# Patient Record
Sex: FEMALE | Race: WHITE | NOT HISPANIC OR LATINO | Employment: PART TIME
[De-identification: names, ages, dates, MRNs, and addresses within clinical notes are randomized per-mention and may not be internally consistent; named-entity substitution may affect disease eponyms.]

---

## 2019-01-01 ENCOUNTER — EXTERNAL RECORD (OUTPATIENT)
Dept: HEALTH INFORMATION MANAGEMENT | Facility: OTHER | Age: 19
End: 2019-01-01

## 2019-03-26 ENCOUNTER — TELEPHONE (OUTPATIENT)
Dept: MATERNAL FETAL MEDICINE | Age: 19
End: 2019-03-26

## 2019-04-11 ENCOUNTER — HOSPITAL (OUTPATIENT)
Dept: OTHER | Age: 19
End: 2019-04-11
Attending: OBSTETRICS & GYNECOLOGY

## 2019-04-11 ENCOUNTER — HOSPITAL (OUTPATIENT)
Dept: OTHER | Age: 19
End: 2019-04-11

## 2019-04-11 LAB
ALBUMIN SERPL-MCNC: 2.6 GM/DL (ref 3.6–5.1)
ALBUMIN/GLOB SERPL: 0.6 {RATIO} (ref 1–2.4)
ALP SERPL-CCNC: 108 UNIT/L (ref 45–117)
ALT SERPL-CCNC: 19 UNIT/L
ANALYZER ANC (IANC): ABNORMAL
ANION GAP SERPL CALC-SCNC: 12 MMOL/L (ref 10–20)
APPEARANCE UR: CLEAR
AST SERPL-CCNC: 14 UNIT/L
BASOPHILS # BLD: 0 THOUSAND/MCL (ref 0–0.3)
BASOPHILS NFR BLD: 0 %
BILIRUB SERPL-MCNC: 0.2 MG/DL (ref 0.2–1)
BILIRUB UR QL STRIP: NEGATIVE
BUN SERPL-MCNC: 4 MG/DL (ref 6–20)
BUN/CREAT SERPL: 7 (ref 7–25)
CALCIUM SERPL-MCNC: 8.7 MG/DL (ref 8.4–10.2)
CHLORIDE: 107 MMOL/L (ref 98–107)
CO2 SERPL-SCNC: 24 MMOL/L (ref 21–32)
COLOR UR: YELLOW
CREAT SERPL-MCNC: 0.55 MG/DL (ref 0.51–0.95)
DIFFERENTIAL METHOD BLD: ABNORMAL
EOSINOPHIL # BLD: 0.2 THOUSAND/MCL (ref 0.1–0.5)
EOSINOPHIL NFR BLD: 1 %
ERYTHROCYTE [DISTWIDTH] IN BLOOD: 15.6 % (ref 11–15)
GLOBULIN SER-MCNC: 4.5 GM/DL (ref 2–4)
GLUCOSE SERPL-MCNC: 84 MG/DL (ref 65–99)
GLUCOSE UR STRIP-MCNC: NEGATIVE MG/DL
HEMATOCRIT: 36.1 % (ref 36–46.5)
HEMOCCULT STL QL: NEGATIVE
HGB BLD-MCNC: 12.1 GM/DL (ref 12–15.5)
IMM GRANULOCYTES # BLD AUTO: 0 THOUSAND/MCL (ref 0–0.2)
IMM GRANULOCYTES NFR BLD: 0 %
KETONES UR STRIP-MCNC: ABNORMAL MG/DL
LEUKOCYTE ESTERASE UR QL STRIP: NEGATIVE
LYMPHOCYTES # BLD: 2.8 THOUSAND/MCL (ref 1.2–5.2)
LYMPHOCYTES NFR BLD: 25 %
MCH RBC QN AUTO: 28.4 PG (ref 26–34)
MCHC RBC AUTO-ENTMCNC: 33.5 GM/DL (ref 32–36.5)
MCV RBC AUTO: 84.7 FL (ref 78–100)
MONOCYTES # BLD: 0.6 THOUSAND/MCL (ref 0.3–0.9)
MONOCYTES NFR BLD: 6 %
NEUTROPHILS # BLD: 7.5 THOUSAND/MCL (ref 1.8–8)
NEUTROPHILS NFR BLD: 68 %
NEUTS SEG NFR BLD: ABNORMAL %
NITRITE UR QL STRIP: NEGATIVE
NRBC (NRBCRE): 0 /100 WBC
ORGANIC ACIDS/CREAT UR-SRTO: 212 MG/DL
PH UR STRIP: 6 UNIT (ref 5–7)
PLATELET # BLD: 335 THOUSAND/MCL (ref 140–450)
POTASSIUM SERPL-SCNC: 3.5 MMOL/L (ref 3.4–5.1)
PROT SERPL-MCNC: 7.1 GM/DL (ref 6.4–8.2)
PROT UR STRIP-MCNC: 30 MG/DL
PROT UR-MCNC: 22 MG/DL
PROT/CREAT UR: 104 MG/GM CREAT
RBC # BLD: 4.26 MILLION/MCL (ref 4–5.2)
SODIUM SERPL-SCNC: 139 MMOL/L (ref 135–145)
SP GR UR STRIP: 1.02 (ref 1–1.03)
UROBILINOGEN UR STRIP-MCNC: 0.2 MG/DL (ref 0–1)
WBC # BLD: 11.2 THOUSAND/MCL (ref 4.2–11)

## 2019-06-07 ENCOUNTER — HOSPITAL (OUTPATIENT)
Dept: OTHER | Age: 19
End: 2019-06-07

## 2019-06-07 LAB
ALBUMIN SERPL-MCNC: 2.5 G/DL (ref 3.6–5.1)
ALBUMIN/GLOB SERPL: 0.5 {RATIO} (ref 1–2.4)
ALP SERPL-CCNC: 152 UNITS/L (ref 45–117)
ALT SERPL-CCNC: 20 UNITS/L
ANALYZER ANC (IANC): ABNORMAL
ANION GAP SERPL CALC-SCNC: 14 MMOL/L (ref 10–20)
AST SERPL-CCNC: 17 UNITS/L
BASOPHILS # BLD: 0.1 K/MCL (ref 0–0.3)
BASOPHILS NFR BLD: 0 %
BILIRUB SERPL-MCNC: 0.3 MG/DL (ref 0.2–1)
BUN SERPL-MCNC: 4 MG/DL (ref 6–20)
BUN/CREAT SERPL: 7 (ref 7–25)
CALCIUM SERPL-MCNC: 8.9 MG/DL (ref 8.4–10.2)
CHLORIDE SERPL-SCNC: 109 MMOL/L (ref 98–107)
CO2 SERPL-SCNC: 21 MMOL/L (ref 21–32)
CREAT SERPL-MCNC: 0.59 MG/DL (ref 0.51–0.95)
DIFFERENTIAL METHOD BLD: ABNORMAL
EOSINOPHIL # BLD: 0.1 K/MCL (ref 0.1–0.5)
EOSINOPHIL NFR BLD: 1 %
ERYTHROCYTE [DISTWIDTH] IN BLOOD: 14.1 % (ref 11–15)
GLOBULIN SER-MCNC: 4.7 G/DL (ref 2–4)
GLUCOSE SERPL-MCNC: 79 MG/DL (ref 65–99)
HCT VFR BLD CALC: 35.6 % (ref 36–46.5)
HGB BLD-MCNC: 11.4 G/DL (ref 12–15.5)
IMM GRANULOCYTES # BLD AUTO: 0.1 K/MCL (ref 0–0.2)
IMM GRANULOCYTES NFR BLD: 1 %
LYMPHOCYTES # BLD: 3.5 K/MCL (ref 1.2–5.2)
LYMPHOCYTES NFR BLD: 25 %
MAGNESIUM SERPL-MCNC: 1.9 MG/DL (ref 1.7–2.4)
MCH RBC QN AUTO: 27.2 PG (ref 26–34)
MCHC RBC AUTO-ENTMCNC: 32 G/DL (ref 32–36.5)
MCV RBC AUTO: 85 FL (ref 78–100)
MONOCYTES # BLD: 0.6 K/MCL (ref 0.3–0.9)
MONOCYTES NFR BLD: 4 %
NEUTROPHILS # BLD: 9.7 K/MCL (ref 1.8–8)
NEUTROPHILS NFR BLD: 69 %
NEUTS SEG NFR BLD: ABNORMAL %
NRBC (NRBCRE): 0 /100 WBC
PLATELET # BLD: 310 K/MCL (ref 140–450)
POTASSIUM SERPL-SCNC: 3.7 MMOL/L (ref 3.4–5.1)
PROT SERPL-MCNC: 7.2 G/DL (ref 6.4–8.2)
RBC # BLD: 4.19 MIL/MCL (ref 4–5.2)
SODIUM SERPL-SCNC: 140 MMOL/L (ref 135–145)
WBC # BLD: 13.9 K/MCL (ref 4.2–11)

## 2019-06-07 PROCEDURE — 59025 FETAL NON-STRESS TEST: CPT | Performed by: OBSTETRICS & GYNECOLOGY

## 2019-06-07 PROCEDURE — 99284 EMERGENCY DEPT VISIT MOD MDM: CPT | Performed by: EMERGENCY MEDICINE

## 2019-06-07 PROCEDURE — 93010 ELECTROCARDIOGRAM REPORT: CPT | Performed by: INTERNAL MEDICINE

## 2019-07-09 ENCOUNTER — TELEPHONE (OUTPATIENT)
Dept: SCHEDULING | Age: 19
End: 2019-07-09

## 2019-07-18 ENCOUNTER — TELEPHONE (OUTPATIENT)
Dept: SCHEDULING | Age: 19
End: 2019-07-18

## 2019-07-19 ENCOUNTER — APPOINTMENT (OUTPATIENT)
Dept: INTERNAL MEDICINE | Age: 19
End: 2019-07-19

## 2019-09-27 ENCOUNTER — TELEPHONE (OUTPATIENT)
Dept: SCHEDULING | Age: 19
End: 2019-09-27

## 2019-09-27 ENCOUNTER — OFFICE VISIT (OUTPATIENT)
Dept: INTERNAL MEDICINE | Age: 19
End: 2019-09-27

## 2019-09-27 VITALS
HEART RATE: 82 BPM | HEIGHT: 62 IN | WEIGHT: 233 LBS | DIASTOLIC BLOOD PRESSURE: 78 MMHG | SYSTOLIC BLOOD PRESSURE: 122 MMHG | RESPIRATION RATE: 16 BRPM | OXYGEN SATURATION: 99 % | BODY MASS INDEX: 42.88 KG/M2

## 2019-09-27 DIAGNOSIS — M54.6 CHRONIC RIGHT-SIDED THORACIC BACK PAIN: ICD-10-CM

## 2019-09-27 DIAGNOSIS — Z00.00 HEALTH MAINTENANCE EXAMINATION: Primary | ICD-10-CM

## 2019-09-27 DIAGNOSIS — G89.29 CHRONIC RIGHT-SIDED THORACIC BACK PAIN: ICD-10-CM

## 2019-09-27 DIAGNOSIS — L30.9 ECZEMA, UNSPECIFIED TYPE: ICD-10-CM

## 2019-09-27 PROCEDURE — 99385 PREV VISIT NEW AGE 18-39: CPT | Performed by: FAMILY MEDICINE

## 2019-09-27 RX ORDER — NORETHINDRONE ACETATE AND ETHINYL ESTRADIOL 1MG-20(21)
1 KIT ORAL DAILY
Status: ON HOLD | COMMUNITY
Start: 2018-07-26 | End: 2021-02-16

## 2019-09-27 SDOH — HEALTH STABILITY: MENTAL HEALTH: HOW OFTEN DO YOU HAVE A DRINK CONTAINING ALCOHOL?: MONTHLY OR LESS

## 2019-09-27 ASSESSMENT — ENCOUNTER SYMPTOMS
ALLERGIC/IMMUNOLOGIC NEGATIVE: 1
NEUROLOGICAL NEGATIVE: 1
ROS SKIN COMMENTS: PER HPI
ENDOCRINE NEGATIVE: 1
EYES NEGATIVE: 1
PSYCHIATRIC NEGATIVE: 1
CONSTITUTIONAL NEGATIVE: 1
RESPIRATORY NEGATIVE: 1
HEMATOLOGIC/LYMPHATIC NEGATIVE: 1

## 2019-09-30 ENCOUNTER — LAB SERVICES (OUTPATIENT)
Dept: LAB | Age: 19
End: 2019-09-30

## 2019-09-30 DIAGNOSIS — Z00.00 HEALTH MAINTENANCE EXAMINATION: ICD-10-CM

## 2019-09-30 LAB
APPEARANCE UR: NORMAL
BASOPHILS # BLD AUTO: 0 K/MCL (ref 0–0.3)
BASOPHILS NFR BLD AUTO: 1 %
BILIRUB UR QL STRIP: NEGATIVE
COLOR UR: YELLOW
DIFFERENTIAL METHOD BLD: ABNORMAL
EOSINOPHIL # BLD AUTO: 0.2 K/MCL (ref 0.1–0.5)
EOSINOPHIL NFR SPEC: 2 %
ERYTHROCYTE [DISTWIDTH] IN BLOOD: 22.5 % (ref 11–15)
GLUCOSE UR STRIP-MCNC: NEGATIVE MG/DL
HCT VFR BLD CALC: 40 % (ref 36–46.5)
HGB BLD-MCNC: 11.6 G/DL (ref 12–15.5)
HGB UR QL STRIP: NEGATIVE
IMM GRANULOCYTES # BLD AUTO: 0 K/MCL (ref 0–0.2)
IMM GRANULOCYTES NFR BLD: 0 %
KETONES UR STRIP-MCNC: NEGATIVE MG/DL
LEUKOCYTE ESTERASE UR QL STRIP: NEGATIVE
LYMPHOCYTES # BLD MANUAL: 3.1 K/MCL (ref 1–4.8)
LYMPHOCYTES NFR BLD MANUAL: 37 %
MCH RBC QN AUTO: 22.1 PG (ref 26–34)
MCHC RBC AUTO-ENTMCNC: 29 G/DL (ref 32–36.5)
MCV RBC AUTO: 76.3 FL (ref 78–100)
MONOCYTES # BLD MANUAL: 0.4 K/MCL (ref 0.3–0.9)
MONOCYTES NFR BLD MANUAL: 5 %
NEUTROPHILS # BLD: 4.7 K/MCL (ref 1.8–7.7)
NEUTROPHILS NFR BLD AUTO: 55 %
NITRITE UR QL STRIP: NEGATIVE
NRBC BLD MANUAL-RTO: 0 /100 WBC
PH UR STRIP: 6 UNITS (ref 5–7)
PLATELET # BLD: 388 K/MCL (ref 140–450)
PROT UR STRIP-MCNC: NEGATIVE MG/DL
RBC # BLD: 5.24 MIL/MCL (ref 4–5.2)
SP GR UR STRIP: 1.01 (ref 1–1.03)
SPECIMEN SOURCE: NORMAL
UROBILINOGEN UR STRIP-MCNC: 0.2 MG/DL (ref 0–1)
WBC # BLD: 8.4 K/MCL (ref 4.2–11)

## 2019-09-30 PROCEDURE — 80061 LIPID PANEL: CPT | Performed by: FAMILY MEDICINE

## 2019-09-30 PROCEDURE — 81003 URINALYSIS AUTO W/O SCOPE: CPT | Performed by: FAMILY MEDICINE

## 2019-09-30 PROCEDURE — 80050 GENERAL HEALTH PANEL: CPT | Performed by: FAMILY MEDICINE

## 2019-10-01 ENCOUNTER — TELEPHONE (OUTPATIENT)
Dept: INTERNAL MEDICINE | Age: 19
End: 2019-10-01

## 2019-10-01 LAB
ALBUMIN SERPL-MCNC: 3.3 G/DL (ref 3.6–5.1)
ALBUMIN/GLOB SERPL: 0.8 {RATIO} (ref 1–2.4)
ALP SERPL-CCNC: 91 UNITS/L (ref 45–117)
ALT SERPL-CCNC: 14 UNITS/L
ANION GAP SERPL CALC-SCNC: 14 MMOL/L (ref 10–20)
AST SERPL-CCNC: 19 UNITS/L
BILIRUB SERPL-MCNC: 0.3 MG/DL (ref 0.2–1)
BUN SERPL-MCNC: 7 MG/DL (ref 6–20)
BUN/CREAT SERPL: 11 (ref 7–25)
CALCIUM SERPL-MCNC: 9 MG/DL (ref 8.4–10.2)
CHLORIDE SERPL-SCNC: 108 MMOL/L (ref 98–107)
CHOLEST SERPL-MCNC: 190 MG/DL
CHOLEST/HDLC SERPL: 4.5 {RATIO}
CO2 SERPL-SCNC: 22 MMOL/L (ref 21–32)
CREAT SERPL-MCNC: 0.67 MG/DL (ref 0.51–0.95)
FASTING STATUS PATIENT QL REPORTED: 10 HRS
GLOBULIN SER-MCNC: 4.1 G/DL (ref 2–4)
GLUCOSE SERPL-MCNC: 81 MG/DL (ref 65–99)
HDLC SERPL-MCNC: 42 MG/DL
LDLC SERPL-MCNC: 120 MG/DL
LENGTH OF FAST TIME PATIENT: 10 HRS
NONHDLC SERPL-MCNC: 148 MG/DL
POTASSIUM SERPL-SCNC: 4.8 MMOL/L (ref 3.4–5.1)
PROT SERPL-MCNC: 7.4 G/DL (ref 6.4–8.2)
SODIUM SERPL-SCNC: 139 MMOL/L (ref 135–145)
TRIGL SERPL-MCNC: 142 MG/DL
TSH SERPL-ACNC: 1.68 MCUNITS/ML (ref 0.35–5)

## 2019-11-15 ENCOUNTER — TELEPHONE (OUTPATIENT)
Dept: SCHEDULING | Age: 19
End: 2019-11-15

## 2019-12-23 ENCOUNTER — OFFICE VISIT (OUTPATIENT)
Dept: INTERNAL MEDICINE | Age: 19
End: 2019-12-23

## 2019-12-23 ENCOUNTER — TELEPHONE (OUTPATIENT)
Dept: SCHEDULING | Age: 19
End: 2019-12-23

## 2019-12-23 VITALS
RESPIRATION RATE: 16 BRPM | BODY MASS INDEX: 42.88 KG/M2 | HEART RATE: 85 BPM | OXYGEN SATURATION: 98 % | SYSTOLIC BLOOD PRESSURE: 124 MMHG | HEIGHT: 62 IN | WEIGHT: 233 LBS | DIASTOLIC BLOOD PRESSURE: 82 MMHG

## 2019-12-23 DIAGNOSIS — K21.9 GASTROESOPHAGEAL REFLUX DISEASE WITHOUT ESOPHAGITIS: ICD-10-CM

## 2019-12-23 DIAGNOSIS — J02.9 PHARYNGITIS, UNSPECIFIED ETIOLOGY: Primary | ICD-10-CM

## 2019-12-23 PROCEDURE — 99214 OFFICE O/P EST MOD 30 MIN: CPT | Performed by: FAMILY MEDICINE

## 2019-12-23 RX ORDER — AMOXICILLIN AND CLAVULANATE POTASSIUM 875; 125 MG/1; MG/1
1 TABLET, FILM COATED ORAL EVERY 12 HOURS
Qty: 20 TABLET | Refills: 0 | Status: ON HOLD | OUTPATIENT
Start: 2019-12-23 | End: 2021-02-16

## 2019-12-23 RX ORDER — FAMOTIDINE 20 MG/1
20 TABLET, FILM COATED ORAL 2 TIMES DAILY
Qty: 60 TABLET | Refills: 5 | Status: ON HOLD | OUTPATIENT
Start: 2019-12-23 | End: 2021-02-16

## 2019-12-23 RX ORDER — LEVONORGESTREL AND ETHINYL ESTRADIOL 0.15-0.03
1 KIT ORAL DAILY
Refills: 3 | Status: ON HOLD | COMMUNITY
Start: 2019-11-10 | End: 2021-02-16

## 2019-12-23 ASSESSMENT — ENCOUNTER SYMPTOMS
CONSTITUTIONAL NEGATIVE: 1
COUGH: 1
SORE THROAT: 1

## 2020-01-01 ENCOUNTER — EXTERNAL RECORD (OUTPATIENT)
Dept: HEALTH INFORMATION MANAGEMENT | Facility: OTHER | Age: 20
End: 2020-01-01

## 2020-03-06 ENCOUNTER — OFFICE VISIT (OUTPATIENT)
Dept: INTERNAL MEDICINE | Age: 20
End: 2020-03-06

## 2020-03-06 ENCOUNTER — LAB SERVICES (OUTPATIENT)
Dept: LAB | Age: 20
End: 2020-03-06

## 2020-03-06 ENCOUNTER — TELEPHONE (OUTPATIENT)
Dept: SCHEDULING | Age: 20
End: 2020-03-06

## 2020-03-06 VITALS
HEART RATE: 78 BPM | BODY MASS INDEX: 46.74 KG/M2 | OXYGEN SATURATION: 99 % | WEIGHT: 254 LBS | DIASTOLIC BLOOD PRESSURE: 80 MMHG | HEIGHT: 62 IN | TEMPERATURE: 99.6 F | SYSTOLIC BLOOD PRESSURE: 124 MMHG

## 2020-03-06 DIAGNOSIS — R19.7 DIARRHEA OF PRESUMED INFECTIOUS ORIGIN: ICD-10-CM

## 2020-03-06 DIAGNOSIS — R21 RASH: Primary | ICD-10-CM

## 2020-03-06 DIAGNOSIS — Z3A.08 8 WEEKS GESTATION OF PREGNANCY: ICD-10-CM

## 2020-03-06 DIAGNOSIS — K21.9 GASTROESOPHAGEAL REFLUX DISEASE WITHOUT ESOPHAGITIS: ICD-10-CM

## 2020-03-06 LAB
ALBUMIN SERPL-MCNC: 3.2 G/DL (ref 3.6–5.1)
ALBUMIN/GLOB SERPL: 0.8 {RATIO} (ref 1–2.4)
ALP SERPL-CCNC: 80 UNITS/L (ref 45–117)
ALT SERPL-CCNC: 17 UNITS/L
ANION GAP SERPL CALC-SCNC: 11 MMOL/L (ref 10–20)
AST SERPL-CCNC: 14 UNITS/L
BILIRUB SERPL-MCNC: 0.3 MG/DL (ref 0.2–1)
BUN SERPL-MCNC: 7 MG/DL (ref 6–20)
BUN/CREAT SERPL: 12 (ref 7–25)
CALCIUM SERPL-MCNC: 8.9 MG/DL (ref 8.4–10.2)
CHLORIDE SERPL-SCNC: 110 MMOL/L (ref 98–107)
CO2 SERPL-SCNC: 23 MMOL/L (ref 21–32)
CREAT SERPL-MCNC: 0.6 MG/DL (ref 0.51–0.95)
GLOBULIN SER-MCNC: 4.2 G/DL (ref 2–4)
GLUCOSE SERPL-MCNC: 95 MG/DL (ref 65–99)
LENGTH OF FAST TIME PATIENT: 8 HRS
POTASSIUM SERPL-SCNC: 4.2 MMOL/L (ref 3.4–5.1)
PROT SERPL-MCNC: 7.4 G/DL (ref 6.4–8.2)
SODIUM SERPL-SCNC: 140 MMOL/L (ref 135–145)

## 2020-03-06 PROCEDURE — 99213 OFFICE O/P EST LOW 20 MIN: CPT | Performed by: FAMILY MEDICINE

## 2020-03-06 PROCEDURE — 36415 COLL VENOUS BLD VENIPUNCTURE: CPT | Performed by: FAMILY MEDICINE

## 2020-03-06 PROCEDURE — 80053 COMPREHEN METABOLIC PANEL: CPT | Performed by: FAMILY MEDICINE

## 2020-03-06 RX ORDER — FLUTICASONE PROPIONATE 50 MCG
SPRAY, SUSPENSION (ML) NASAL
Refills: 0 | Status: ON HOLD | COMMUNITY
Start: 2019-12-18 | End: 2021-02-16

## 2020-03-06 RX ORDER — BETAMETHASONE DIPROPIONATE 0.5 MG/G
CREAM TOPICAL 2 TIMES DAILY
Qty: 30 G | Refills: 0 | Status: SHIPPED | OUTPATIENT
Start: 2020-03-06 | End: 2020-03-13

## 2020-03-06 ASSESSMENT — ENCOUNTER SYMPTOMS
WOUND: 0
LIGHT-HEADEDNESS: 0
COUGH: 0
FATIGUE: 0
DIARRHEA: 1
VOICE CHANGE: 0
APPETITE CHANGE: 0
SLEEP DISTURBANCE: 0
CHEST TIGHTNESS: 0
BACK PAIN: 0
SORE THROAT: 0
ABDOMINAL PAIN: 1
WEAKNESS: 0
NUMBNESS: 0
BRUISES/BLEEDS EASILY: 0
BLOOD IN STOOL: 0
CHILLS: 0
NAUSEA: 1
CONSTIPATION: 0
SPEECH DIFFICULTY: 0
EYE DISCHARGE: 0
SEIZURES: 0
ACTIVITY CHANGE: 0
DIZZINESS: 0
WHEEZING: 0
EYE REDNESS: 0
NERVOUS/ANXIOUS: 0
ABDOMINAL DISTENTION: 0
FEVER: 0
SHORTNESS OF BREATH: 0
VOMITING: 0

## 2020-03-09 ENCOUNTER — TELEPHONE (OUTPATIENT)
Dept: SCHEDULING | Age: 20
End: 2020-03-09

## 2020-03-09 ENCOUNTER — TELEPHONE (OUTPATIENT)
Dept: INTERNAL MEDICINE | Age: 20
End: 2020-03-09

## 2020-03-16 ENCOUNTER — NURSE TRIAGE (OUTPATIENT)
Dept: TELEHEALTH | Age: 20
End: 2020-03-16

## 2020-03-16 ENCOUNTER — TELEPHONE (OUTPATIENT)
Dept: SCHEDULING | Age: 20
End: 2020-03-16

## 2020-03-20 ENCOUNTER — E-ADVICE (OUTPATIENT)
Dept: INTERNAL MEDICINE | Age: 20
End: 2020-03-20

## 2020-03-23 ENCOUNTER — TELEPHONE (OUTPATIENT)
Dept: SCHEDULING | Age: 20
End: 2020-03-23

## 2020-04-30 ENCOUNTER — TELEPHONE (OUTPATIENT)
Dept: SCHEDULING | Age: 20
End: 2020-04-30

## 2020-04-30 ENCOUNTER — WALK IN (OUTPATIENT)
Dept: URGENT CARE | Age: 20
End: 2020-04-30

## 2020-04-30 VITALS
BODY MASS INDEX: 46.82 KG/M2 | TEMPERATURE: 98 F | HEIGHT: 62 IN | WEIGHT: 254.41 LBS | SYSTOLIC BLOOD PRESSURE: 127 MMHG | DIASTOLIC BLOOD PRESSURE: 81 MMHG | RESPIRATION RATE: 18 BRPM | OXYGEN SATURATION: 97 % | HEART RATE: 75 BPM

## 2020-04-30 DIAGNOSIS — R10.9 LEFT FLANK PAIN: ICD-10-CM

## 2020-04-30 DIAGNOSIS — R39.15 URINARY URGENCY: Primary | ICD-10-CM

## 2020-04-30 LAB
APPEARANCE, POC: CLEAR
B-HCG UR QL: NEGATIVE
BILIRUBIN, POC: NEGATIVE
COLOR, POC: NORMAL
GLUCOSE UR-MCNC: NEGATIVE MG/DL
KETONES, POC: NEGATIVE
NITRITE, POC: NEGATIVE
OCCULT BLOOD, POC: NEGATIVE
PH UR: 6.5 [PH] (ref 5–7)
PROT UR-MCNC: NEGATIVE G/DL
SP GR UR: >= 1.03 (ref 1–1.03)
UROBILINOGEN UR-MCNC: 0.2 MG/DL (ref 0–1)
WBC (LEUKOCYTE) ESTERASE, POC: NEGATIVE

## 2020-04-30 PROCEDURE — 81002 URINALYSIS NONAUTO W/O SCOPE: CPT | Performed by: NURSE PRACTITIONER

## 2020-04-30 PROCEDURE — 99214 OFFICE O/P EST MOD 30 MIN: CPT | Performed by: NURSE PRACTITIONER

## 2020-04-30 PROCEDURE — 87147 CULTURE TYPE IMMUNOLOGIC: CPT | Performed by: NURSE PRACTITIONER

## 2020-04-30 PROCEDURE — 81025 URINE PREGNANCY TEST: CPT | Performed by: NURSE PRACTITIONER

## 2020-04-30 PROCEDURE — 87086 URINE CULTURE/COLONY COUNT: CPT | Performed by: NURSE PRACTITIONER

## 2020-04-30 RX ORDER — CEPHALEXIN 500 MG/1
500 CAPSULE ORAL 4 TIMES DAILY
Qty: 40 CAPSULE | Refills: 0 | Status: SHIPPED | OUTPATIENT
Start: 2020-04-30 | End: 2020-05-10

## 2020-04-30 RX ORDER — ACETAMINOPHEN 500 MG
1000 TABLET ORAL EVERY 6 HOURS PRN
Qty: 56 TABLET | Refills: 0 | Status: SHIPPED | OUTPATIENT
Start: 2020-04-30 | End: 2020-05-07

## 2020-04-30 SDOH — HEALTH STABILITY: MENTAL HEALTH: HOW OFTEN DO YOU HAVE A DRINK CONTAINING ALCOHOL?: NOT ASKED

## 2020-05-02 LAB
BACTERIA UR CULT: ABNORMAL
REPORT STATUS (RPT): ABNORMAL
SPECIMEN SOURCE: ABNORMAL

## 2020-10-17 ENCOUNTER — TELEPHONE (OUTPATIENT)
Dept: SCHEDULING | Age: 20
End: 2020-10-17

## 2020-10-22 ENCOUNTER — V-VISIT (OUTPATIENT)
Dept: INTERNAL MEDICINE | Age: 20
End: 2020-10-22

## 2020-10-22 DIAGNOSIS — E28.2 PCOS (POLYCYSTIC OVARIAN SYNDROME): ICD-10-CM

## 2020-10-22 DIAGNOSIS — F41.9 ANXIETY: Primary | ICD-10-CM

## 2020-10-22 PROCEDURE — 96127 BRIEF EMOTIONAL/BEHAV ASSMT: CPT | Performed by: FAMILY MEDICINE

## 2020-10-22 PROCEDURE — 99214 OFFICE O/P EST MOD 30 MIN: CPT | Performed by: FAMILY MEDICINE

## 2020-10-22 RX ORDER — SERTRALINE HYDROCHLORIDE 25 MG/1
25 TABLET, FILM COATED ORAL DAILY
Qty: 30 TABLET | Refills: 3 | Status: ON HOLD | OUTPATIENT
Start: 2020-10-22 | End: 2021-02-16

## 2020-10-22 ASSESSMENT — ANXIETY QUESTIONNAIRES
1. FEELING NERVOUS, ANXIOUS, OR ON EDGE: MORE THAN HALF THE DAYS
GAD7 TOTAL SCORE: 12
7. FEELING AFRAID AS IF SOMETHING AWFUL MIGHT HAPPEN: SEVERAL DAYS
5. BEING SO RESTLESS THAT IT IS HARD TO SIT STILL: SEVERAL DAYS
2. NOT BEING ABLE TO STOP OR CONTROL WORRYING: 2
4. TROUBLE RELAXING: 2
1. FEELING NERVOUS, ANXIOUS, OR ON EDGE: 2
3. WORRYING TOO MUCH ABOUT DIFFERENT THINGS: MORE THAN HALF THE DAYS
IF YOU CHECKED OFF ANY PROBLEMS ON THIS QUESTIONNAIRE, HOW DIFFICULT HAVE THESE PROBLEMS MADE IT FOR YOU TO DO YOUR WORK, TAKE CARE OF THINGS AT HOME, OR GET ALONG WITH OTHER PEOPLE: VERY DIFFICULT
5. BEING SO RESTLESS THAT IT IS HARD TO SIT STILL: 1
3. WORRYING TOO MUCH ABOUT DIFFERENT THINGS: 2
6. BECOMING EASILY ANNOYED OR IRRITABLE: 2
2. NOT BEING ABLE TO STOP OR CONTROL WORRYING: MORE THAN HALF THE DAYS
6. BECOMING EASILY ANNOYED OR IRRITABLE: MORE THAN HALF THE DAYS
4. TROUBLE RELAXING: MORE THAN HALF THE DAYS
7. FEELING AFRAID AS IF SOMETHING AWFUL MIGHT HAPPEN: 1

## 2020-10-22 ASSESSMENT — PATIENT HEALTH QUESTIONNAIRE - PHQ9
CLINICAL INTERPRETATION OF PHQ2 SCORE: NO FURTHER SCREENING NEEDED
1. LITTLE INTEREST OR PLEASURE IN DOING THINGS: SEVERAL DAYS
2. FEELING DOWN, DEPRESSED OR HOPELESS: SEVERAL DAYS
CLINICAL INTERPRETATION OF PHQ9 SCORE: NO FURTHER SCREENING NEEDED
SUM OF ALL RESPONSES TO PHQ9 QUESTIONS 1 AND 2: 2
SUM OF ALL RESPONSES TO PHQ9 QUESTIONS 1 AND 2: 2

## 2020-10-22 ASSESSMENT — ENCOUNTER SYMPTOMS
CONSTITUTIONAL NEGATIVE: 1
RESPIRATORY NEGATIVE: 1

## 2020-11-19 ENCOUNTER — APPOINTMENT (OUTPATIENT)
Dept: INTERNAL MEDICINE | Age: 20
End: 2020-11-19

## 2020-12-29 ENCOUNTER — TELEPHONE (OUTPATIENT)
Dept: SCHEDULING | Age: 20
End: 2020-12-29

## 2021-01-01 ENCOUNTER — EXTERNAL RECORD (OUTPATIENT)
Dept: HEALTH INFORMATION MANAGEMENT | Facility: OTHER | Age: 21
End: 2021-01-01

## 2021-01-01 ENCOUNTER — EXTERNAL RECORD (OUTPATIENT)
Dept: OTHER | Age: 21
End: 2021-01-01

## 2021-02-15 ENCOUNTER — HOSPITAL ENCOUNTER (OUTPATIENT)
Age: 21
Discharge: HOME | End: 2021-02-16
Attending: OBSTETRICS & GYNECOLOGY | Admitting: OBSTETRICS & GYNECOLOGY

## 2021-02-15 ENCOUNTER — TELEPHONE (OUTPATIENT)
Dept: SCHEDULING | Age: 21
End: 2021-02-15

## 2021-02-15 ENCOUNTER — APPOINTMENT (OUTPATIENT)
Dept: GENERAL RADIOLOGY | Age: 21
End: 2021-02-15

## 2021-02-15 DIAGNOSIS — R00.2 HEART PALPITATIONS: ICD-10-CM

## 2021-02-15 DIAGNOSIS — Z3A.17 17 WEEKS GESTATION OF PREGNANCY: ICD-10-CM

## 2021-02-15 DIAGNOSIS — Z33.1 PREGNANT STATE, INCIDENTAL: ICD-10-CM

## 2021-02-15 DIAGNOSIS — R06.00 DYSPNEA, UNSPECIFIED TYPE: Primary | ICD-10-CM

## 2021-02-15 DIAGNOSIS — R03.0 ELEVATED BLOOD PRESSURE READING: ICD-10-CM

## 2021-02-15 LAB
ABO + RH BLD: NORMAL
ALBUMIN SERPL-MCNC: 2.9 G/DL (ref 3.6–5.1)
ALBUMIN/GLOB SERPL: 0.6 {RATIO} (ref 1–2.4)
ALP SERPL-CCNC: 89 UNITS/L (ref 45–117)
ALT SERPL-CCNC: 23 UNITS/L
ANION GAP SERPL CALC-SCNC: 10 MMOL/L (ref 10–20)
APPEARANCE UR: CLEAR
AST SERPL-CCNC: 13 UNITS/L
BACTERIA #/AREA URNS HPF: ABNORMAL /HPF
BASOPHILS # BLD: 0 K/MCL (ref 0–0.3)
BASOPHILS NFR BLD: 0 %
BILIRUB SERPL-MCNC: 0.2 MG/DL (ref 0.2–1)
BILIRUB UR QL STRIP: NEGATIVE
BUN SERPL-MCNC: 5 MG/DL (ref 6–20)
BUN/CREAT SERPL: 8 (ref 7–25)
CALCIUM SERPL-MCNC: 9.2 MG/DL (ref 8.4–10.2)
CHLORIDE SERPL-SCNC: 108 MMOL/L (ref 98–107)
CO2 SERPL-SCNC: 25 MMOL/L (ref 21–32)
COLOR UR: ABNORMAL
CREAT SERPL-MCNC: 0.65 MG/DL (ref 0.51–0.95)
CREAT UR-MCNC: 55 MG/DL
D DIMER PPP FEU-MCNC: 0.56 MG/L (FEU)
DEPRECATED RDW RBC: 48 FL (ref 39–50)
EOSINOPHIL # BLD: 0.2 K/MCL (ref 0–0.5)
EOSINOPHIL NFR BLD: 2 %
ERYTHROCYTE [DISTWIDTH] IN BLOOD: 16.2 % (ref 11–15)
FASTING DURATION TIME PATIENT: ABNORMAL H
GFR SERPLBLD BASED ON 1.73 SQ M-ARVRAT: >90 ML/MIN/1.73M2
GLOBULIN SER-MCNC: 4.5 G/DL (ref 2–4)
GLUCOSE SERPL-MCNC: 93 MG/DL (ref 65–99)
GLUCOSE UR STRIP-MCNC: NEGATIVE MG/DL
HCG SERPL-ACNC: ABNORMAL MUNITS/ML
HCT VFR BLD CALC: 41.3 % (ref 36–46.5)
HGB BLD-MCNC: 13.2 G/DL (ref 12–15.5)
HGB UR QL STRIP: NEGATIVE
HYALINE CASTS #/AREA URNS LPF: ABNORMAL /LPF
IMM GRANULOCYTES # BLD AUTO: 0 K/MCL (ref 0–0.2)
IMM GRANULOCYTES # BLD: 0 %
KETONES UR STRIP-MCNC: 20 MG/DL
LEUKOCYTE ESTERASE UR QL STRIP: NEGATIVE
LYMPHOCYTES # BLD: 2.8 K/MCL (ref 1–4.8)
LYMPHOCYTES NFR BLD: 30 %
MAGNESIUM SERPL-MCNC: 2.1 MG/DL (ref 1.7–2.4)
MCH RBC QN AUTO: 25.8 PG (ref 26–34)
MCHC RBC AUTO-ENTMCNC: 32 G/DL (ref 32–36.5)
MCV RBC AUTO: 80.7 FL (ref 78–100)
MONOCYTES # BLD: 0.5 K/MCL (ref 0.3–0.9)
MONOCYTES NFR BLD: 5 %
MUCOUS THREADS URNS QL MICRO: PRESENT
NEUTROPHILS # BLD: 5.8 K/MCL (ref 1.8–7.7)
NEUTROPHILS NFR BLD: 63 %
NITRITE UR QL STRIP: NEGATIVE
NRBC BLD MANUAL-RTO: 0 /100 WBC
NT-PROBNP SERPL-MCNC: 26 PG/ML
PH UR STRIP: 7 [PH] (ref 5–7)
PLATELET # BLD AUTO: 319 K/MCL (ref 140–450)
POTASSIUM SERPL-SCNC: 3.9 MMOL/L (ref 3.4–5.1)
PROT SERPL-MCNC: 7.4 G/DL (ref 6.4–8.2)
PROT UR STRIP-MCNC: NEGATIVE MG/DL
PROT UR-MCNC: 8 MG/DL
PROT/CREAT UR: 145 MGPR/GCR
RBC # BLD: 5.12 MIL/MCL (ref 4–5.2)
RBC #/AREA URNS HPF: ABNORMAL /HPF
SODIUM SERPL-SCNC: 139 MMOL/L (ref 135–145)
SP GR UR STRIP: 1.01 (ref 1–1.03)
SQUAMOUS #/AREA URNS HPF: ABNORMAL /HPF
TROPONIN I SERPL HS-MCNC: <0.02 NG/ML
TSH SERPL-ACNC: 1.27 MCUNITS/ML (ref 0.35–5)
UROBILINOGEN UR STRIP-MCNC: 0.2 MG/DL
WBC # BLD: 9.3 K/MCL (ref 4.2–11)
WBC #/AREA URNS HPF: ABNORMAL /HPF

## 2021-02-15 PROCEDURE — 81003 URINALYSIS AUTO W/O SCOPE: CPT | Performed by: STUDENT IN AN ORGANIZED HEALTH CARE EDUCATION/TRAINING PROGRAM

## 2021-02-15 PROCEDURE — 83735 ASSAY OF MAGNESIUM: CPT | Performed by: EMERGENCY MEDICINE

## 2021-02-15 PROCEDURE — 80053 COMPREHEN METABOLIC PANEL: CPT | Performed by: EMERGENCY MEDICINE

## 2021-02-15 PROCEDURE — 99285 EMERGENCY DEPT VISIT HI MDM: CPT

## 2021-02-15 PROCEDURE — 85379 FIBRIN DEGRADATION QUANT: CPT | Performed by: STUDENT IN AN ORGANIZED HEALTH CARE EDUCATION/TRAINING PROGRAM

## 2021-02-15 PROCEDURE — 84484 ASSAY OF TROPONIN QUANT: CPT | Performed by: EMERGENCY MEDICINE

## 2021-02-15 PROCEDURE — 85025 COMPLETE CBC W/AUTO DIFF WBC: CPT | Performed by: EMERGENCY MEDICINE

## 2021-02-15 PROCEDURE — 71045 X-RAY EXAM CHEST 1 VIEW: CPT

## 2021-02-15 PROCEDURE — 36415 COLL VENOUS BLD VENIPUNCTURE: CPT

## 2021-02-15 PROCEDURE — 84702 CHORIONIC GONADOTROPIN TEST: CPT | Performed by: EMERGENCY MEDICINE

## 2021-02-15 PROCEDURE — 86901 BLOOD TYPING SEROLOGIC RH(D): CPT | Performed by: EMERGENCY MEDICINE

## 2021-02-15 PROCEDURE — 84156 ASSAY OF PROTEIN URINE: CPT | Performed by: STUDENT IN AN ORGANIZED HEALTH CARE EDUCATION/TRAINING PROGRAM

## 2021-02-15 PROCEDURE — 93005 ELECTROCARDIOGRAM TRACING: CPT | Performed by: EMERGENCY MEDICINE

## 2021-02-15 PROCEDURE — 84443 ASSAY THYROID STIM HORMONE: CPT | Performed by: EMERGENCY MEDICINE

## 2021-02-15 PROCEDURE — 83880 ASSAY OF NATRIURETIC PEPTIDE: CPT | Performed by: STUDENT IN AN ORGANIZED HEALTH CARE EDUCATION/TRAINING PROGRAM

## 2021-02-15 ASSESSMENT — PAIN SCALES - GENERAL: PAINLEVEL_OUTOF10: 5

## 2021-02-16 ENCOUNTER — TELEPHONE (OUTPATIENT)
Dept: SCHEDULING | Age: 21
End: 2021-02-16

## 2021-02-16 ENCOUNTER — HOSPITAL ENCOUNTER (OUTPATIENT)
Age: 21
Setting detail: OBSERVATION
Discharge: HOME | End: 2021-02-17
Attending: STUDENT IN AN ORGANIZED HEALTH CARE EDUCATION/TRAINING PROGRAM | Admitting: INTERNAL MEDICINE

## 2021-02-16 VITALS
WEIGHT: 258 LBS | SYSTOLIC BLOOD PRESSURE: 116 MMHG | OXYGEN SATURATION: 98 % | DIASTOLIC BLOOD PRESSURE: 68 MMHG | RESPIRATION RATE: 18 BRPM | TEMPERATURE: 99 F | BODY MASS INDEX: 47.48 KG/M2 | HEIGHT: 62 IN | HEART RATE: 100 BPM

## 2021-02-16 DIAGNOSIS — R00.2 PALPITATIONS: Primary | ICD-10-CM

## 2021-02-16 DIAGNOSIS — R00.0 TACHYCARDIA: Primary | ICD-10-CM

## 2021-02-16 LAB
ALBUMIN SERPL-MCNC: 3 G/DL (ref 3.6–5.1)
ALBUMIN/GLOB SERPL: 0.7 {RATIO} (ref 1–2.4)
ALP SERPL-CCNC: 84 UNITS/L (ref 45–117)
ALT SERPL-CCNC: 22 UNITS/L
ANION GAP SERPL CALC-SCNC: 10 MMOL/L (ref 10–20)
AST SERPL-CCNC: 18 UNITS/L
ATRIAL RATE (BPM): 93
ATRIAL RATE (BPM): 96
BASOPHILS # BLD: 0 K/MCL (ref 0–0.3)
BASOPHILS NFR BLD: 0 %
BILIRUB SERPL-MCNC: 0.2 MG/DL (ref 0.2–1)
BUN SERPL-MCNC: 4 MG/DL (ref 6–20)
BUN/CREAT SERPL: 8 (ref 7–25)
CALCIUM SERPL-MCNC: 9.1 MG/DL (ref 8.4–10.2)
CHLORIDE SERPL-SCNC: 106 MMOL/L (ref 98–107)
CO2 SERPL-SCNC: 24 MMOL/L (ref 21–32)
CREAT SERPL-MCNC: 0.5 MG/DL (ref 0.51–0.95)
DEPRECATED RDW RBC: 46.6 FL (ref 39–50)
EOSINOPHIL # BLD: 0.1 K/MCL (ref 0–0.5)
EOSINOPHIL NFR BLD: 1 %
ERYTHROCYTE [DISTWIDTH] IN BLOOD: 16.5 % (ref 11–15)
FASTING DURATION TIME PATIENT: ABNORMAL H
GFR SERPLBLD BASED ON 1.73 SQ M-ARVRAT: >90 ML/MIN/1.73M2
GLOBULIN SER-MCNC: 4.6 G/DL (ref 2–4)
GLUCOSE SERPL-MCNC: 81 MG/DL (ref 65–99)
HCT VFR BLD CALC: 39.7 % (ref 36–46.5)
HGB BLD-MCNC: 13 G/DL (ref 12–15.5)
IMM GRANULOCYTES # BLD AUTO: 0 K/MCL (ref 0–0.2)
IMM GRANULOCYTES # BLD: 0 %
LYMPHOCYTES # BLD: 2.8 K/MCL (ref 1–4.8)
LYMPHOCYTES NFR BLD: 29 %
MAGNESIUM SERPL-MCNC: 2.2 MG/DL (ref 1.7–2.4)
MCH RBC QN AUTO: 26.1 PG (ref 26–34)
MCHC RBC AUTO-ENTMCNC: 32.7 G/DL (ref 32–36.5)
MCV RBC AUTO: 79.6 FL (ref 78–100)
MONOCYTES # BLD: 0.5 K/MCL (ref 0.3–0.9)
MONOCYTES NFR BLD: 5 %
NEUTROPHILS # BLD: 6.3 K/MCL (ref 1.8–7.7)
NEUTROPHILS NFR BLD: 65 %
NRBC BLD MANUAL-RTO: 0 /100 WBC
P AXIS (DEGREES): 32
P AXIS (DEGREES): 33
PLATELET # BLD AUTO: 304 K/MCL (ref 140–450)
POTASSIUM SERPL-SCNC: 3.4 MMOL/L (ref 3.4–5.1)
PR-INTERVAL (MSEC): 128
PR-INTERVAL (MSEC): 138
PROT SERPL-MCNC: 7.6 G/DL (ref 6.4–8.2)
QRS-INTERVAL (MSEC): 72
QRS-INTERVAL (MSEC): 76
QT-INTERVAL (MSEC): 328
QT-INTERVAL (MSEC): 336
QTC: 414
QTC: 418
R AXIS (DEGREES): 12
R AXIS (DEGREES): 14
RAINBOW EXTRA TUBES HOLD SPECIMEN: NORMAL
RBC # BLD: 4.99 MIL/MCL (ref 4–5.2)
REPORT TEXT: NORMAL
REPORT TEXT: NORMAL
SARS-COV-2 RNA RESP QL NAA+PROBE: NOT DETECTED
SERVICE CMNT-IMP: NORMAL
SERVICE CMNT-IMP: NORMAL
SODIUM SERPL-SCNC: 137 MMOL/L (ref 135–145)
T AXIS (DEGREES): 34
T AXIS (DEGREES): 34
VENTRICULAR RATE EKG/MIN (BPM): 93
VENTRICULAR RATE EKG/MIN (BPM): 96
WBC # BLD: 9.7 K/MCL (ref 4.2–11)

## 2021-02-16 PROCEDURE — 93010 ELECTROCARDIOGRAM REPORT: CPT | Performed by: INTERNAL MEDICINE

## 2021-02-16 PROCEDURE — 99285 EMERGENCY DEPT VISIT HI MDM: CPT

## 2021-02-16 PROCEDURE — 99203 OFFICE O/P NEW LOW 30 MIN: CPT | Performed by: OBSTETRICS & GYNECOLOGY

## 2021-02-16 PROCEDURE — 85025 COMPLETE CBC W/AUTO DIFF WBC: CPT | Performed by: EMERGENCY MEDICINE

## 2021-02-16 PROCEDURE — X1094 NO CHARGE VISIT: HCPCS | Performed by: EMERGENCY MEDICINE

## 2021-02-16 PROCEDURE — C9803 HOPD COVID-19 SPEC COLLECT: HCPCS

## 2021-02-16 PROCEDURE — 10002807 HB RX 258: Performed by: EMERGENCY MEDICINE

## 2021-02-16 PROCEDURE — 93005 ELECTROCARDIOGRAM TRACING: CPT | Performed by: EMERGENCY MEDICINE

## 2021-02-16 PROCEDURE — U0003 INFECTIOUS AGENT DETECTION BY NUCLEIC ACID (DNA OR RNA); SEVERE ACUTE RESPIRATORY SYNDROME CORONAVIRUS 2 (SARS-COV-2) (CORONAVIRUS DISEASE [COVID-19]), AMPLIFIED PROBE TECHNIQUE, MAKING USE OF HIGH THROUGHPUT TECHNOLOGIES AS DESCRIBED BY CMS-2020-01-R: HCPCS | Performed by: EMERGENCY MEDICINE

## 2021-02-16 PROCEDURE — 99211 OFF/OP EST MAY X REQ PHY/QHP: CPT

## 2021-02-16 PROCEDURE — 96360 HYDRATION IV INFUSION INIT: CPT

## 2021-02-16 PROCEDURE — G0378 HOSPITAL OBSERVATION PER HR: HCPCS

## 2021-02-16 PROCEDURE — 80053 COMPREHEN METABOLIC PANEL: CPT | Performed by: EMERGENCY MEDICINE

## 2021-02-16 PROCEDURE — 83735 ASSAY OF MAGNESIUM: CPT | Performed by: EMERGENCY MEDICINE

## 2021-02-16 PROCEDURE — 99285 EMERGENCY DEPT VISIT HI MDM: CPT | Performed by: STUDENT IN AN ORGANIZED HEALTH CARE EDUCATION/TRAINING PROGRAM

## 2021-02-16 RX ORDER — ASPIRIN 81 MG/1
81 TABLET, CHEWABLE ORAL DAILY
COMMUNITY
End: 2021-12-01

## 2021-02-16 RX ORDER — SODIUM CHLORIDE, SODIUM LACTATE, POTASSIUM CHLORIDE, CALCIUM CHLORIDE 600; 310; 30; 20 MG/100ML; MG/100ML; MG/100ML; MG/100ML
INJECTION, SOLUTION INTRAVENOUS CONTINUOUS
Status: DISCONTINUED | OUTPATIENT
Start: 2021-02-16 | End: 2021-02-17

## 2021-02-16 RX ORDER — BUTALBITAL, ACETAMINOPHEN AND CAFFEINE 50; 325; 40 MG/1; MG/1; MG/1
1 TABLET ORAL EVERY 4 HOURS PRN
COMMUNITY
End: 2022-06-03

## 2021-02-16 RX ORDER — VITAMIN A ACETATE, BETA CAROTENE, ASCORBIC ACID, CHOLECALCIFEROL, .ALPHA.-TOCOPHEROL ACETATE, DL-, THIAMINE MONONITRATE, RIBOFLAVIN, NIACINAMIDE, PYRIDOXINE HYDROCHLORIDE, FOLIC ACID, CYANOCOBALAMIN, CALCIUM CARBONATE, FERROUS FUMARATE, ZINC OXIDE, CUPRIC OXIDE 3080; 12; 120; 400; 1; 1.84; 3; 20; 22; 920; 25; 200; 27; 10; 2 [IU]/1; UG/1; MG/1; [IU]/1; MG/1; MG/1; MG/1; MG/1; MG/1; [IU]/1; MG/1; MG/1; MG/1; MG/1; MG/1
1 TABLET, FILM COATED ORAL DAILY
COMMUNITY
End: 2022-06-03

## 2021-02-16 RX ADMIN — SODIUM CHLORIDE, SODIUM LACTATE, POTASSIUM CHLORIDE, AND CALCIUM CHLORIDE 1000 ML: .6; .31; .03; .02 INJECTION, SOLUTION INTRAVENOUS at 22:09

## 2021-02-16 SDOH — HEALTH STABILITY: MENTAL HEALTH: HOW OFTEN DO YOU HAVE A DRINK CONTAINING ALCOHOL?: NOT ASKED

## 2021-02-16 ASSESSMENT — ENCOUNTER SYMPTOMS
COLOR CHANGE: 0
HEADACHES: 0
BRUISES/BLEEDS EASILY: 0
POLYDIPSIA: 0
APPETITE CHANGE: 0
EYE PAIN: 0
NERVOUS/ANXIOUS: 0
SORE THROAT: 0
VOMITING: 0
ABDOMINAL PAIN: 0
NAUSEA: 0
CONFUSION: 0
SHORTNESS OF BREATH: 0
FATIGUE: 0
CHILLS: 0
VOICE CHANGE: 0
BACK PAIN: 0
COUGH: 0
LIGHT-HEADEDNESS: 1
FEVER: 0
EYE DISCHARGE: 0
NUMBNESS: 0
BLOOD IN STOOL: 0
DIARRHEA: 0
WEAKNESS: 1

## 2021-02-16 ASSESSMENT — PAIN SCALES - GENERAL
PAINLEVEL: 7
PAINLEVEL_OUTOF10: 0

## 2021-02-17 VITALS
SYSTOLIC BLOOD PRESSURE: 132 MMHG | BODY MASS INDEX: 47.26 KG/M2 | WEIGHT: 256.84 LBS | HEIGHT: 62 IN | OXYGEN SATURATION: 94 % | HEART RATE: 73 BPM | RESPIRATION RATE: 15 BRPM | DIASTOLIC BLOOD PRESSURE: 78 MMHG | TEMPERATURE: 98.2 F

## 2021-02-17 LAB
RAINBOW EXTRA TUBES HOLD SPECIMEN: NORMAL

## 2021-02-17 PROCEDURE — 10002803 HB RX 637: Performed by: STUDENT IN AN ORGANIZED HEALTH CARE EDUCATION/TRAINING PROGRAM

## 2021-02-17 PROCEDURE — 10002807 HB RX 258: Performed by: STUDENT IN AN ORGANIZED HEALTH CARE EDUCATION/TRAINING PROGRAM

## 2021-02-17 PROCEDURE — 10002803 HB RX 637: Performed by: INTERNAL MEDICINE

## 2021-02-17 PROCEDURE — G0378 HOSPITAL OBSERVATION PER HR: HCPCS

## 2021-02-17 PROCEDURE — 10004651 HB RX, NO CHARGE ITEM: Performed by: STUDENT IN AN ORGANIZED HEALTH CARE EDUCATION/TRAINING PROGRAM

## 2021-02-17 PROCEDURE — 10002807 HB RX 258: Performed by: EMERGENCY MEDICINE

## 2021-02-17 PROCEDURE — 99235 HOSP IP/OBS SAME DATE MOD 70: CPT | Performed by: INTERNAL MEDICINE

## 2021-02-17 RX ORDER — BUTALBITAL, ACETAMINOPHEN AND CAFFEINE 50; 325; 40 MG/1; MG/1; MG/1
1 TABLET ORAL EVERY 4 HOURS PRN
Status: DISCONTINUED | OUTPATIENT
Start: 2021-02-17 | End: 2021-02-17 | Stop reason: HOSPADM

## 2021-02-17 RX ORDER — ASPIRIN 81 MG/1
81 TABLET, CHEWABLE ORAL DAILY
Status: DISCONTINUED | OUTPATIENT
Start: 2021-02-17 | End: 2021-02-17 | Stop reason: HOSPADM

## 2021-02-17 RX ORDER — ONDANSETRON 4 MG/1
4 TABLET, ORALLY DISINTEGRATING ORAL EVERY 6 HOURS PRN
Status: DISCONTINUED | OUTPATIENT
Start: 2021-02-17 | End: 2021-02-17 | Stop reason: HOSPADM

## 2021-02-17 RX ORDER — 0.9 % SODIUM CHLORIDE 0.9 %
2 VIAL (ML) INJECTION EVERY 12 HOURS SCHEDULED
Status: DISCONTINUED | OUTPATIENT
Start: 2021-02-17 | End: 2021-02-17 | Stop reason: HOSPADM

## 2021-02-17 RX ORDER — VITAMIN A, VITAMIN C, VITAMIN D-3, VITAMIN E, VITAMIN B-1, VITAMIN B-2, NIACIN, VITAMIN B-6, CALCIUM, IRON, ZINC, COPPER 4000; 120; 400; 22; 1.84; 3; 20; 10; 1; 12; 200; 27; 25; 2 [IU]/1; MG/1; [IU]/1; MG/1; MG/1; MG/1; MG/1; MG/1; MG/1; UG/1; MG/1; MG/1; MG/1; MG/1
1 TABLET ORAL DAILY
Status: DISCONTINUED | OUTPATIENT
Start: 2021-02-17 | End: 2021-02-17 | Stop reason: HOSPADM

## 2021-02-17 RX ADMIN — SODIUM CHLORIDE, POTASSIUM CHLORIDE, SODIUM LACTATE AND CALCIUM CHLORIDE 1000 ML: 600; 310; 30; 20 INJECTION, SOLUTION INTRAVENOUS at 05:24

## 2021-02-17 RX ADMIN — SODIUM CHLORIDE, PRESERVATIVE FREE 2 ML: 5 INJECTION INTRAVENOUS at 01:58

## 2021-02-17 RX ADMIN — ASPIRIN 81 MG: 81 TABLET, CHEWABLE ORAL at 08:10

## 2021-02-17 RX ADMIN — SODIUM CHLORIDE, PRESERVATIVE FREE 2 ML: 5 INJECTION INTRAVENOUS at 08:11

## 2021-02-17 RX ADMIN — VITAMIN A, VITAMIN C, VITAMIN D-3, VITAMIN E, VITAMIN B-1, VITAMIN B-2, NIACIN, VITAMIN B-6, CALCIUM, IRON, ZINC, COPPER 1 TABLET: 4000; 120; 400; 22; 1.84; 3; 20; 10; 1; 12; 200; 27; 25; 2 TABLET ORAL at 08:10

## 2021-02-17 RX ADMIN — ONDANSETRON 4 MG: 4 TABLET, ORALLY DISINTEGRATING ORAL at 10:08

## 2021-02-17 RX ADMIN — SODIUM CHLORIDE, SODIUM LACTATE, POTASSIUM CHLORIDE, AND CALCIUM CHLORIDE: .6; .31; .03; .02 INJECTION, SOLUTION INTRAVENOUS at 01:05

## 2021-02-17 ASSESSMENT — COGNITIVE AND FUNCTIONAL STATUS - GENERAL
DO YOU HAVE SERIOUS DIFFICULTY WALKING OR CLIMBING STAIRS: NO
ARE YOU DEAF OR DO YOU HAVE SERIOUS DIFFICULTY  HEARING: NO
BECAUSE OF A PHYSICAL, MENTAL, OR EMOTIONAL CONDITION, DO YOU HAVE SERIOUS DIFFICULTY CONCENTRATING, REMEMBERING OR MAKING DECISIONS: NO
DO YOU HAVE DIFFICULTY DRESSING OR BATHING: NO
BECAUSE OF A PHYSICAL, MENTAL, OR EMOTIONAL CONDITION, DO YOU HAVE DIFFICULTY DOING ERRANDS ALONE: NO
ARE YOU BLIND OR DO YOU HAVE SERIOUS DIFFICULTY SEEING, EVEN WHEN WEARING GLASSES: NO

## 2021-02-17 ASSESSMENT — LIFESTYLE VARIABLES
ALCOHOL_USE_STATUS: NO OR LOW RISK WITH VALIDATED TOOL
HOW OFTEN DO YOU HAVE A DRINK CONTAINING ALCOHOL: NEVER
HOW OFTEN DO YOU HAVE 6 OR MORE DRINKS ON ONE OCCASION: NEVER
AUDIT-C TOTAL SCORE: 0
HOW MANY STANDARD DRINKS CONTAINING ALCOHOL DO YOU HAVE ON A TYPICAL DAY: 0,1 OR 2

## 2021-02-17 ASSESSMENT — ACTIVITIES OF DAILY LIVING (ADL)
RECENT_DECLINE_ADL: NO
ADL_SCORE: 12
CHRONIC_PAIN_PRESENT: NO
ADL_BEFORE_ADMISSION: INDEPENDENT
ADL_SHORT_OF_BREATH: NO

## 2021-02-17 ASSESSMENT — PAIN SCALES - GENERAL
PAINLEVEL_OUTOF10: 0
PAINLEVEL_OUTOF10: 0

## 2021-02-18 ENCOUNTER — TELEPHONE (OUTPATIENT)
Dept: SCHEDULING | Age: 21
End: 2021-02-18

## 2021-02-18 ENCOUNTER — V-VISIT (OUTPATIENT)
Dept: INTERNAL MEDICINE | Age: 21
End: 2021-02-18

## 2021-02-18 DIAGNOSIS — Z09 HOSPITAL DISCHARGE FOLLOW-UP: ICD-10-CM

## 2021-02-18 DIAGNOSIS — Z3A.17 17 WEEKS GESTATION OF PREGNANCY: ICD-10-CM

## 2021-02-18 DIAGNOSIS — R00.2 RAPID PALPITATIONS: Primary | ICD-10-CM

## 2021-02-18 PROCEDURE — 99214 OFFICE O/P EST MOD 30 MIN: CPT | Performed by: FAMILY MEDICINE

## 2021-02-18 ASSESSMENT — ENCOUNTER SYMPTOMS
BACK PAIN: 0
SORE THROAT: 0
VOMITING: 0
HEADACHES: 0
LIGHT-HEADEDNESS: 1
ABDOMINAL PAIN: 0
ACTIVITY CHANGE: 1
COUGH: 0
SHORTNESS OF BREATH: 1
FEVER: 0
RESPIRATORY NEGATIVE: 1
DIARRHEA: 0

## 2021-02-19 ENCOUNTER — TELEPHONE (OUTPATIENT)
Dept: SCHEDULING | Age: 21
End: 2021-02-19

## 2021-02-23 ENCOUNTER — OFFICE VISIT (OUTPATIENT)
Dept: CARDIOLOGY | Age: 21
End: 2021-02-23

## 2021-02-23 VITALS
HEART RATE: 88 BPM | BODY MASS INDEX: 47.01 KG/M2 | SYSTOLIC BLOOD PRESSURE: 118 MMHG | WEIGHT: 257 LBS | DIASTOLIC BLOOD PRESSURE: 70 MMHG

## 2021-02-23 DIAGNOSIS — Z34.82 ENCOUNTER FOR SUPERVISION OF OTHER NORMAL PREGNANCY IN SECOND TRIMESTER: ICD-10-CM

## 2021-02-23 DIAGNOSIS — R00.2 PALPITATIONS: Primary | ICD-10-CM

## 2021-02-23 DIAGNOSIS — I95.1 ORTHOSTATIC HYPOTENSION: ICD-10-CM

## 2021-02-23 DIAGNOSIS — R00.0 SINUS TACHYCARDIA: ICD-10-CM

## 2021-02-23 DIAGNOSIS — E66.01 MORBID OBESITY (CMD): ICD-10-CM

## 2021-02-23 PROCEDURE — 99244 OFF/OP CNSLTJ NEW/EST MOD 40: CPT | Performed by: INTERNAL MEDICINE

## 2021-02-23 ASSESSMENT — PATIENT HEALTH QUESTIONNAIRE - PHQ9
2. FEELING DOWN, DEPRESSED OR HOPELESS: NOT AT ALL
CLINICAL INTERPRETATION OF PHQ2 SCORE: NO FURTHER SCREENING NEEDED
SUM OF ALL RESPONSES TO PHQ9 QUESTIONS 1 AND 2: 0
SUM OF ALL RESPONSES TO PHQ9 QUESTIONS 1 AND 2: 0
CLINICAL INTERPRETATION OF PHQ9 SCORE: NO FURTHER SCREENING NEEDED
1. LITTLE INTEREST OR PLEASURE IN DOING THINGS: NOT AT ALL

## 2021-03-05 ENCOUNTER — ANCILLARY PROCEDURE (OUTPATIENT)
Dept: CARDIOLOGY | Age: 21
End: 2021-03-05
Attending: INTERNAL MEDICINE

## 2021-03-05 DIAGNOSIS — R00.0 SINUS TACHYCARDIA: ICD-10-CM

## 2021-03-05 DIAGNOSIS — I95.1 ORTHOSTATIC HYPOTENSION: ICD-10-CM

## 2021-03-05 DIAGNOSIS — R00.2 PALPITATIONS: ICD-10-CM

## 2021-03-05 PROCEDURE — 93224 XTRNL ECG REC UP TO 48 HRS: CPT | Performed by: INTERNAL MEDICINE

## 2021-03-08 ENCOUNTER — APPOINTMENT (OUTPATIENT)
Dept: CARDIOLOGY | Age: 21
End: 2021-03-08
Attending: INTERNAL MEDICINE

## 2021-03-13 ENCOUNTER — APPOINTMENT (OUTPATIENT)
Dept: CARDIOLOGY | Age: 21
End: 2021-03-13
Attending: INTERNAL MEDICINE

## 2021-03-13 ENCOUNTER — HOSPITAL ENCOUNTER (OUTPATIENT)
Dept: CARDIOLOGY | Age: 21
Discharge: HOME | End: 2021-03-13
Attending: INTERNAL MEDICINE

## 2021-03-13 DIAGNOSIS — R00.2 PALPITATIONS: ICD-10-CM

## 2021-03-13 DIAGNOSIS — I95.1 ORTHOSTATIC HYPOTENSION: ICD-10-CM

## 2021-03-13 DIAGNOSIS — R00.0 SINUS TACHYCARDIA: ICD-10-CM

## 2021-03-13 PROCEDURE — 93306 TTE W/DOPPLER COMPLETE: CPT

## 2021-03-13 PROCEDURE — 93306 TTE W/DOPPLER COMPLETE: CPT | Performed by: INTERNAL MEDICINE

## 2021-03-15 ENCOUNTER — TELEPHONE (OUTPATIENT)
Dept: CARDIOLOGY | Age: 21
End: 2021-03-15

## 2021-04-05 ENCOUNTER — OFFICE VISIT (OUTPATIENT)
Dept: CARDIOLOGY | Age: 21
End: 2021-04-05

## 2021-04-05 VITALS
HEART RATE: 78 BPM | WEIGHT: 257 LBS | BODY MASS INDEX: 47.01 KG/M2 | SYSTOLIC BLOOD PRESSURE: 110 MMHG | DIASTOLIC BLOOD PRESSURE: 64 MMHG

## 2021-04-05 DIAGNOSIS — Z34.82 ENCOUNTER FOR SUPERVISION OF OTHER NORMAL PREGNANCY IN SECOND TRIMESTER: ICD-10-CM

## 2021-04-05 DIAGNOSIS — R00.2 PALPITATIONS: ICD-10-CM

## 2021-04-05 DIAGNOSIS — E66.01 MORBID OBESITY (CMD): ICD-10-CM

## 2021-04-05 DIAGNOSIS — I47.11 INAPPROPRIATE SINUS TACHYCARDIA: Primary | ICD-10-CM

## 2021-04-05 PROCEDURE — 99214 OFFICE O/P EST MOD 30 MIN: CPT | Performed by: INTERNAL MEDICINE

## 2021-04-05 RX ORDER — METOPROLOL SUCCINATE 25 MG/1
25 TABLET, EXTENDED RELEASE ORAL DAILY
Qty: 90 TABLET | Refills: 3 | Status: SHIPPED | OUTPATIENT
Start: 2021-04-05 | End: 2021-04-21 | Stop reason: DRUGHIGH

## 2021-04-20 ENCOUNTER — HOSPITAL ENCOUNTER (OUTPATIENT)
Age: 21
Setting detail: OBSERVATION
Discharge: HOME | End: 2021-04-20
Attending: EMERGENCY MEDICINE | Admitting: OBSTETRICS & GYNECOLOGY

## 2021-04-20 VITALS
SYSTOLIC BLOOD PRESSURE: 128 MMHG | DIASTOLIC BLOOD PRESSURE: 74 MMHG | BODY MASS INDEX: 46.93 KG/M2 | HEIGHT: 62 IN | RESPIRATION RATE: 16 BRPM | HEART RATE: 76 BPM | TEMPERATURE: 98.4 F | WEIGHT: 255 LBS | OXYGEN SATURATION: 100 %

## 2021-04-20 DIAGNOSIS — I10 HYPERTENSION, UNSPECIFIED TYPE: Primary | ICD-10-CM

## 2021-04-20 DIAGNOSIS — R00.2 PALPITATIONS: ICD-10-CM

## 2021-04-20 DIAGNOSIS — G44.201 INTRACTABLE TENSION-TYPE HEADACHE, UNSPECIFIED CHRONICITY PATTERN: ICD-10-CM

## 2021-04-20 DIAGNOSIS — E66.01 OBESITY, CLASS III, BMI 40-49.9 (MORBID OBESITY) (CMD): ICD-10-CM

## 2021-04-20 DIAGNOSIS — Z3A.26 26 WEEKS GESTATION OF PREGNANCY: ICD-10-CM

## 2021-04-20 PROBLEM — E66.813 OBESITY, CLASS III, BMI 40-49.9 (MORBID OBESITY): Status: ACTIVE | Noted: 2021-04-20

## 2021-04-20 LAB
ALBUMIN SERPL-MCNC: 2.5 G/DL (ref 3.6–5.1)
ALBUMIN/GLOB SERPL: 0.6 {RATIO} (ref 1–2.4)
ALP SERPL-CCNC: 114 UNITS/L (ref 45–117)
ALT SERPL-CCNC: 16 UNITS/L
ANION GAP SERPL CALC-SCNC: 10 MMOL/L (ref 10–20)
APPEARANCE UR: CLEAR
AST SERPL-CCNC: 10 UNITS/L
ATRIAL RATE (BPM): 112
BACTERIA #/AREA URNS HPF: ABNORMAL /HPF
BASOPHILS # BLD: 0 K/MCL (ref 0–0.3)
BASOPHILS NFR BLD: 0 %
BILIRUB SERPL-MCNC: 0.2 MG/DL (ref 0.2–1)
BILIRUB UR QL STRIP: NEGATIVE
BUN SERPL-MCNC: 4 MG/DL (ref 6–20)
BUN/CREAT SERPL: 6 (ref 7–25)
CALCIUM SERPL-MCNC: 8.6 MG/DL (ref 8.4–10.2)
CHLORIDE SERPL-SCNC: 108 MMOL/L (ref 98–107)
CO2 SERPL-SCNC: 25 MMOL/L (ref 21–32)
COLOR UR: YELLOW
CREAT SERPL-MCNC: 0.66 MG/DL (ref 0.51–0.95)
CREAT UR-MCNC: 82 MG/DL
DEPRECATED RDW RBC: 48.3 FL (ref 39–50)
EOSINOPHIL # BLD: 0.1 K/MCL (ref 0–0.5)
EOSINOPHIL NFR BLD: 1 %
ERYTHROCYTE [DISTWIDTH] IN BLOOD: 16.1 % (ref 11–15)
FASTING DURATION TIME PATIENT: ABNORMAL H
GFR SERPLBLD BASED ON 1.73 SQ M-ARVRAT: >90 ML/MIN/1.73M2
GLOBULIN SER-MCNC: 4.1 G/DL (ref 2–4)
GLUCOSE SERPL-MCNC: 111 MG/DL (ref 65–99)
GLUCOSE UR STRIP-MCNC: NEGATIVE MG/DL
HCT VFR BLD CALC: 36.4 % (ref 36–46.5)
HGB BLD-MCNC: 11.8 G/DL (ref 12–15.5)
HGB UR QL STRIP: NEGATIVE
HYALINE CASTS #/AREA URNS LPF: ABNORMAL /LPF
IMM GRANULOCYTES # BLD AUTO: 0 K/MCL (ref 0–0.2)
IMM GRANULOCYTES # BLD: 0 %
KETONES UR STRIP-MCNC: NEGATIVE MG/DL
LEUKOCYTE ESTERASE UR QL STRIP: NEGATIVE
LYMPHOCYTES # BLD: 2.3 K/MCL (ref 1–4.8)
LYMPHOCYTES NFR BLD: 21 %
MCH RBC QN AUTO: 26.8 PG (ref 26–34)
MCHC RBC AUTO-ENTMCNC: 32.4 G/DL (ref 32–36.5)
MCV RBC AUTO: 82.5 FL (ref 78–100)
MONOCYTES # BLD: 0.5 K/MCL (ref 0.3–0.9)
MONOCYTES NFR BLD: 5 %
NEUTROPHILS # BLD: 8 K/MCL (ref 1.8–7.7)
NEUTROPHILS NFR BLD: 73 %
NITRITE UR QL STRIP: NEGATIVE
NRBC BLD MANUAL-RTO: 0 /100 WBC
P AXIS (DEGREES): 36
PH UR STRIP: 7 [PH] (ref 5–7)
PLATELET # BLD AUTO: 297 K/MCL (ref 140–450)
POTASSIUM SERPL-SCNC: 3.8 MMOL/L (ref 3.4–5.1)
PR-INTERVAL (MSEC): 128
PROT SERPL-MCNC: 6.6 G/DL (ref 6.4–8.2)
PROT UR STRIP-MCNC: NEGATIVE MG/DL
PROT UR-MCNC: 17 MG/DL
PROT/CREAT UR: 207 MGPR/GCR
QRS-INTERVAL (MSEC): 80
QT-INTERVAL (MSEC): 312
QTC: 426
R AXIS (DEGREES): 36
RBC # BLD: 4.41 MIL/MCL (ref 4–5.2)
RBC #/AREA URNS HPF: ABNORMAL /HPF
REPORT TEXT: NORMAL
SODIUM SERPL-SCNC: 139 MMOL/L (ref 135–145)
SP GR UR STRIP: 1.02 (ref 1–1.03)
SQUAMOUS #/AREA URNS HPF: ABNORMAL /HPF
T AXIS (DEGREES): 15
TROPONIN I SERPL HS-MCNC: <0.02 NG/ML
UROBILINOGEN UR STRIP-MCNC: 1 MG/DL
VENTRICULAR RATE EKG/MIN (BPM): 112
WBC # BLD: 11 K/MCL (ref 4.2–11)
WBC #/AREA URNS HPF: ABNORMAL /HPF

## 2021-04-20 PROCEDURE — 99213 OFFICE O/P EST LOW 20 MIN: CPT | Performed by: OBSTETRICS & GYNECOLOGY

## 2021-04-20 PROCEDURE — 99211 OFF/OP EST MAY X REQ PHY/QHP: CPT

## 2021-04-20 PROCEDURE — 99284 EMERGENCY DEPT VISIT MOD MDM: CPT | Performed by: EMERGENCY MEDICINE

## 2021-04-20 PROCEDURE — 76815 OB US LIMITED FETUS(S): CPT

## 2021-04-20 PROCEDURE — 85025 COMPLETE CBC W/AUTO DIFF WBC: CPT | Performed by: EMERGENCY MEDICINE

## 2021-04-20 PROCEDURE — G0378 HOSPITAL OBSERVATION PER HR: HCPCS

## 2021-04-20 PROCEDURE — 59025 FETAL NON-STRESS TEST: CPT | Performed by: OBSTETRICS & GYNECOLOGY

## 2021-04-20 PROCEDURE — 36415 COLL VENOUS BLD VENIPUNCTURE: CPT

## 2021-04-20 PROCEDURE — 93005 ELECTROCARDIOGRAM TRACING: CPT | Performed by: EMERGENCY MEDICINE

## 2021-04-20 PROCEDURE — 84484 ASSAY OF TROPONIN QUANT: CPT | Performed by: EMERGENCY MEDICINE

## 2021-04-20 PROCEDURE — 99285 EMERGENCY DEPT VISIT HI MDM: CPT

## 2021-04-20 PROCEDURE — 84156 ASSAY OF PROTEIN URINE: CPT | Performed by: STUDENT IN AN ORGANIZED HEALTH CARE EDUCATION/TRAINING PROGRAM

## 2021-04-20 PROCEDURE — 10002803 HB RX 637: Performed by: STUDENT IN AN ORGANIZED HEALTH CARE EDUCATION/TRAINING PROGRAM

## 2021-04-20 PROCEDURE — 80053 COMPREHEN METABOLIC PANEL: CPT | Performed by: EMERGENCY MEDICINE

## 2021-04-20 PROCEDURE — 81003 URINALYSIS AUTO W/O SCOPE: CPT | Performed by: EMERGENCY MEDICINE

## 2021-04-20 RX ORDER — BUTALBITAL, ACETAMINOPHEN AND CAFFEINE 50; 325; 40 MG/1; MG/1; MG/1
1 TABLET ORAL ONCE
Status: COMPLETED | OUTPATIENT
Start: 2021-04-20 | End: 2021-04-20

## 2021-04-20 RX ORDER — 0.9 % SODIUM CHLORIDE 0.9 %
2 VIAL (ML) INJECTION EVERY 12 HOURS SCHEDULED
Status: DISCONTINUED | OUTPATIENT
Start: 2021-04-21 | End: 2021-04-21 | Stop reason: HOSPADM

## 2021-04-20 RX ADMIN — BUTALBITAL, ACETAMINOPHEN AND CAFFEINE 1 TABLET: 50; 325; 40 TABLET ORAL at 22:41

## 2021-04-20 ASSESSMENT — ENCOUNTER SYMPTOMS
ABDOMINAL PAIN: 0
DOUBLE VISION: 0
FEVER: 0
DIZZINESS: 0
VOMITING: 0
HEADACHES: 1
BLURRED VISION: 0
NAUSEA: 0
CHILLS: 0
HEARTBURN: 0

## 2021-04-20 ASSESSMENT — PAIN SCALES - GENERAL
PAINLEVEL_OUTOF10: 4
PAINLEVEL_OUTOF10: 8

## 2021-04-21 ENCOUNTER — TELEPHONE (OUTPATIENT)
Dept: CARDIOLOGY | Age: 21
End: 2021-04-21

## 2021-04-21 DIAGNOSIS — I47.11 INAPPROPRIATE SINUS TACHYCARDIA: Primary | ICD-10-CM

## 2021-04-21 DIAGNOSIS — R00.2 PALPITATIONS: ICD-10-CM

## 2021-04-21 LAB
RAINBOW EXTRA TUBES HOLD SPECIMEN: NORMAL

## 2021-04-21 RX ORDER — METOPROLOL SUCCINATE 50 MG/1
50 TABLET, EXTENDED RELEASE ORAL DAILY
Qty: 90 TABLET | Refills: 3 | Status: SHIPPED | OUTPATIENT
Start: 2021-04-21 | End: 2021-04-28 | Stop reason: DRUGHIGH

## 2021-04-22 ENCOUNTER — TELEPHONE (OUTPATIENT)
Dept: CARDIOLOGY | Age: 21
End: 2021-04-22

## 2021-04-28 ENCOUNTER — OFFICE VISIT (OUTPATIENT)
Dept: CARDIOLOGY | Age: 21
End: 2021-04-28

## 2021-04-28 DIAGNOSIS — R00.2 PALPITATIONS: ICD-10-CM

## 2021-04-28 PROCEDURE — 93000 ELECTROCARDIOGRAM COMPLETE: CPT | Performed by: INTERNAL MEDICINE

## 2021-04-28 PROCEDURE — 99204 OFFICE O/P NEW MOD 45 MIN: CPT | Performed by: INTERNAL MEDICINE

## 2021-04-28 RX ORDER — METOPROLOL SUCCINATE 50 MG/1
50 TABLET, EXTENDED RELEASE ORAL 2 TIMES DAILY
Qty: 180 TABLET | Refills: 3 | Status: SHIPPED | OUTPATIENT
Start: 2021-04-28 | End: 2021-05-17 | Stop reason: DRUGHIGH

## 2021-04-28 ASSESSMENT — PATIENT HEALTH QUESTIONNAIRE - PHQ9
SUM OF ALL RESPONSES TO PHQ9 QUESTIONS 1 AND 2: 0
2. FEELING DOWN, DEPRESSED OR HOPELESS: NOT AT ALL
1. LITTLE INTEREST OR PLEASURE IN DOING THINGS: NOT AT ALL
CLINICAL INTERPRETATION OF PHQ9 SCORE: NO FURTHER SCREENING NEEDED
CLINICAL INTERPRETATION OF PHQ2 SCORE: NO FURTHER SCREENING NEEDED
SUM OF ALL RESPONSES TO PHQ9 QUESTIONS 1 AND 2: 0

## 2021-04-28 ASSESSMENT — PAIN SCALES - GENERAL: PAINLEVEL: 0

## 2021-05-17 ENCOUNTER — OFFICE VISIT (OUTPATIENT)
Dept: CARDIOLOGY | Age: 21
End: 2021-05-17

## 2021-05-17 VITALS
BODY MASS INDEX: 47.91 KG/M2 | HEART RATE: 92 BPM | DIASTOLIC BLOOD PRESSURE: 74 MMHG | WEIGHT: 262 LBS | SYSTOLIC BLOOD PRESSURE: 120 MMHG

## 2021-05-17 DIAGNOSIS — Z3A.34 34 WEEKS GESTATION OF PREGNANCY: ICD-10-CM

## 2021-05-17 DIAGNOSIS — E66.01 OBESITY, CLASS III, BMI 40-49.9 (MORBID OBESITY) (CMD): ICD-10-CM

## 2021-05-17 DIAGNOSIS — I47.11 INAPPROPRIATE SINUS NODE TACHYCARDIA: Primary | ICD-10-CM

## 2021-05-17 DIAGNOSIS — R00.2 PALPITATIONS: ICD-10-CM

## 2021-05-17 PROCEDURE — 99214 OFFICE O/P EST MOD 30 MIN: CPT | Performed by: INTERNAL MEDICINE

## 2021-05-17 RX ORDER — METOPROLOL SUCCINATE 100 MG/1
100 TABLET, EXTENDED RELEASE ORAL 2 TIMES DAILY
Qty: 180 TABLET | Refills: 3 | Status: SHIPPED | OUTPATIENT
Start: 2021-05-17 | End: 2021-06-04 | Stop reason: SDUPTHER

## 2021-05-17 ASSESSMENT — PATIENT HEALTH QUESTIONNAIRE - PHQ9
1. LITTLE INTEREST OR PLEASURE IN DOING THINGS: NOT AT ALL
CLINICAL INTERPRETATION OF PHQ2 SCORE: NO FURTHER SCREENING NEEDED
SUM OF ALL RESPONSES TO PHQ9 QUESTIONS 1 AND 2: 0
SUM OF ALL RESPONSES TO PHQ9 QUESTIONS 1 AND 2: 0
2. FEELING DOWN, DEPRESSED OR HOPELESS: NOT AT ALL
CLINICAL INTERPRETATION OF PHQ9 SCORE: NO FURTHER SCREENING NEEDED

## 2021-05-26 VITALS
OXYGEN SATURATION: 100 % | SYSTOLIC BLOOD PRESSURE: 110 MMHG | HEART RATE: 90 BPM | RESPIRATION RATE: 18 BRPM | TEMPERATURE: 97.4 F | BODY MASS INDEX: 47.77 KG/M2 | WEIGHT: 261.25 LBS | DIASTOLIC BLOOD PRESSURE: 70 MMHG

## 2021-06-04 ENCOUNTER — TELEPHONE (OUTPATIENT)
Dept: CARDIOLOGY | Age: 21
End: 2021-06-04

## 2021-06-04 DIAGNOSIS — R00.2 PALPITATIONS: ICD-10-CM

## 2021-06-04 DIAGNOSIS — I47.11 INAPPROPRIATE SINUS NODE TACHYCARDIA: ICD-10-CM

## 2021-06-04 RX ORDER — METOPROLOL SUCCINATE 100 MG/1
100 TABLET, EXTENDED RELEASE ORAL 2 TIMES DAILY
Qty: 6 TABLET | Refills: 0 | Status: SHIPPED | OUTPATIENT
Start: 2021-06-04 | End: 2021-08-12 | Stop reason: DRUGHIGH

## 2021-06-09 ENCOUNTER — OFFICE VISIT (OUTPATIENT)
Dept: CARDIOLOGY | Age: 21
End: 2021-06-09

## 2021-06-09 VITALS
SYSTOLIC BLOOD PRESSURE: 98 MMHG | HEART RATE: 87 BPM | OXYGEN SATURATION: 99 % | RESPIRATION RATE: 18 BRPM | DIASTOLIC BLOOD PRESSURE: 60 MMHG | BODY MASS INDEX: 48.09 KG/M2 | WEIGHT: 263.01 LBS | TEMPERATURE: 97.6 F

## 2021-06-09 DIAGNOSIS — R00.2 PALPITATIONS: ICD-10-CM

## 2021-06-09 PROCEDURE — 99213 OFFICE O/P EST LOW 20 MIN: CPT | Performed by: INTERNAL MEDICINE

## 2021-06-09 PROCEDURE — 93000 ELECTROCARDIOGRAM COMPLETE: CPT | Performed by: INTERNAL MEDICINE

## 2021-06-09 ASSESSMENT — PATIENT HEALTH QUESTIONNAIRE - PHQ9
CLINICAL INTERPRETATION OF PHQ9 SCORE: NO FURTHER SCREENING NEEDED
1. LITTLE INTEREST OR PLEASURE IN DOING THINGS: NOT AT ALL
SUM OF ALL RESPONSES TO PHQ9 QUESTIONS 1 AND 2: 0
CLINICAL INTERPRETATION OF PHQ2 SCORE: NO FURTHER SCREENING NEEDED
SUM OF ALL RESPONSES TO PHQ9 QUESTIONS 1 AND 2: 0
2. FEELING DOWN, DEPRESSED OR HOPELESS: NOT AT ALL

## 2021-06-09 ASSESSMENT — PAIN SCALES - GENERAL: PAINLEVEL: 0

## 2021-06-13 ENCOUNTER — TELEPHONE (OUTPATIENT)
Dept: SCHEDULING | Age: 21
End: 2021-06-13

## 2021-06-28 ENCOUNTER — TELEPHONE (OUTPATIENT)
Dept: CARDIOLOGY | Age: 21
End: 2021-06-28

## 2021-07-01 ENCOUNTER — APPOINTMENT (OUTPATIENT)
Dept: CT IMAGING | Age: 21
End: 2021-07-01
Attending: EMERGENCY MEDICINE

## 2021-07-01 ENCOUNTER — TELEPHONE (OUTPATIENT)
Dept: CARDIOLOGY | Age: 21
End: 2021-07-01

## 2021-07-01 ENCOUNTER — HOSPITAL ENCOUNTER (OUTPATIENT)
Age: 21
Discharge: HOME | End: 2021-07-02
Attending: EMERGENCY MEDICINE | Admitting: OBSTETRICS & GYNECOLOGY

## 2021-07-01 DIAGNOSIS — E66.01 OBESITY, CLASS III, BMI 40-49.9 (MORBID OBESITY) (CMD): ICD-10-CM

## 2021-07-01 DIAGNOSIS — R00.2 PALPITATIONS: ICD-10-CM

## 2021-07-01 DIAGNOSIS — Z3A.36 36 WEEKS GESTATION OF PREGNANCY: ICD-10-CM

## 2021-07-01 LAB
ALBUMIN SERPL-MCNC: 2.3 G/DL (ref 3.6–5.1)
ALBUMIN/GLOB SERPL: 0.5 {RATIO} (ref 1–2.4)
ALP SERPL-CCNC: 161 UNITS/L (ref 45–117)
ALT SERPL-CCNC: 23 UNITS/L
ANION GAP SERPL CALC-SCNC: 11 MMOL/L (ref 10–20)
APPEARANCE UR: ABNORMAL
AST SERPL-CCNC: 15 UNITS/L
BASOPHILS # BLD: 0 K/MCL (ref 0–0.3)
BASOPHILS NFR BLD: 0 %
BILIRUB SERPL-MCNC: 0.3 MG/DL (ref 0.2–1)
BILIRUB UR QL STRIP: NEGATIVE
BUN SERPL-MCNC: 5 MG/DL (ref 6–20)
BUN/CREAT SERPL: 9 (ref 7–25)
CALCIUM SERPL-MCNC: 8.6 MG/DL (ref 8.4–10.2)
CHLORIDE SERPL-SCNC: 107 MMOL/L (ref 98–107)
CO2 SERPL-SCNC: 23 MMOL/L (ref 21–32)
COLOR UR: YELLOW
CREAT SERPL-MCNC: 0.58 MG/DL (ref 0.51–0.95)
D DIMER PPP FEU-MCNC: 1.19 MG/L (FEU)
DEPRECATED RDW RBC: 47.2 FL (ref 39–50)
EOSINOPHIL # BLD: 0.1 K/MCL (ref 0–0.5)
EOSINOPHIL NFR BLD: 1 %
ERYTHROCYTE [DISTWIDTH] IN BLOOD: 15.8 % (ref 11–15)
FASTING DURATION TIME PATIENT: ABNORMAL H
GFR SERPLBLD BASED ON 1.73 SQ M-ARVRAT: >90 ML/MIN/1.73M2
GLOBULIN SER-MCNC: 4.5 G/DL (ref 2–4)
GLUCOSE SERPL-MCNC: 82 MG/DL (ref 65–99)
GLUCOSE UR STRIP-MCNC: NEGATIVE MG/DL
HCT VFR BLD CALC: 36.5 % (ref 36–46.5)
HGB BLD-MCNC: 11.3 G/DL (ref 12–15.5)
HGB UR QL STRIP: NEGATIVE
IMM GRANULOCYTES # BLD AUTO: 0 K/MCL (ref 0–0.2)
IMM GRANULOCYTES # BLD: 0 %
KETONES UR STRIP-MCNC: NEGATIVE MG/DL
LEUKOCYTE ESTERASE UR QL STRIP: NEGATIVE
LYMPHOCYTES # BLD: 2.4 K/MCL (ref 1–4.8)
LYMPHOCYTES NFR BLD: 23 %
MCH RBC QN AUTO: 25.7 PG (ref 26–34)
MCHC RBC AUTO-ENTMCNC: 31 G/DL (ref 32–36.5)
MCV RBC AUTO: 83 FL (ref 78–100)
MONOCYTES # BLD: 0.6 K/MCL (ref 0.3–0.9)
MONOCYTES NFR BLD: 6 %
NEUTROPHILS # BLD: 7 K/MCL (ref 1.8–7.7)
NEUTROPHILS NFR BLD: 70 %
NITRITE UR QL STRIP: NEGATIVE
NRBC BLD MANUAL-RTO: 0 /100 WBC
PH UR STRIP: 7 [PH] (ref 5–7)
PLATELET # BLD AUTO: 328 K/MCL (ref 140–450)
POTASSIUM SERPL-SCNC: 3.3 MMOL/L (ref 3.4–5.1)
PROT SERPL-MCNC: 6.8 G/DL (ref 6.4–8.2)
PROT UR STRIP-MCNC: NEGATIVE MG/DL
RBC # BLD: 4.4 MIL/MCL (ref 4–5.2)
SODIUM SERPL-SCNC: 138 MMOL/L (ref 135–145)
SP GR UR STRIP: 1.01 (ref 1–1.03)
TROPONIN I SERPL HS-MCNC: <0.02 NG/ML
TSH SERPL-ACNC: 0.87 MCUNITS/ML (ref 0.35–5)
UROBILINOGEN UR STRIP-MCNC: 4 MG/DL
WBC # BLD: 10.2 K/MCL (ref 4.2–11)

## 2021-07-01 PROCEDURE — 10002805 HB CONTRAST AGENT: Performed by: EMERGENCY MEDICINE

## 2021-07-01 PROCEDURE — 10002807 HB RX 258: Performed by: EMERGENCY MEDICINE

## 2021-07-01 PROCEDURE — 85025 COMPLETE CBC W/AUTO DIFF WBC: CPT | Performed by: EMERGENCY MEDICINE

## 2021-07-01 PROCEDURE — 81003 URINALYSIS AUTO W/O SCOPE: CPT | Performed by: EMERGENCY MEDICINE

## 2021-07-01 PROCEDURE — 99285 EMERGENCY DEPT VISIT HI MDM: CPT

## 2021-07-01 PROCEDURE — 85379 FIBRIN DEGRADATION QUANT: CPT | Performed by: EMERGENCY MEDICINE

## 2021-07-01 PROCEDURE — 71275 CT ANGIOGRAPHY CHEST: CPT

## 2021-07-01 PROCEDURE — 80053 COMPREHEN METABOLIC PANEL: CPT | Performed by: EMERGENCY MEDICINE

## 2021-07-01 PROCEDURE — 36415 COLL VENOUS BLD VENIPUNCTURE: CPT

## 2021-07-01 PROCEDURE — 80050 GENERAL HEALTH PANEL: CPT | Performed by: EMERGENCY MEDICINE

## 2021-07-01 PROCEDURE — 84443 ASSAY THYROID STIM HORMONE: CPT | Performed by: EMERGENCY MEDICINE

## 2021-07-01 PROCEDURE — 84484 ASSAY OF TROPONIN QUANT: CPT | Performed by: EMERGENCY MEDICINE

## 2021-07-01 PROCEDURE — 99285 EMERGENCY DEPT VISIT HI MDM: CPT | Performed by: STUDENT IN AN ORGANIZED HEALTH CARE EDUCATION/TRAINING PROGRAM

## 2021-07-01 PROCEDURE — 93005 ELECTROCARDIOGRAM TRACING: CPT | Performed by: EMERGENCY MEDICINE

## 2021-07-01 RX ADMIN — SODIUM CHLORIDE 1000 ML: 9 INJECTION, SOLUTION INTRAVENOUS at 19:51

## 2021-07-01 RX ADMIN — IOHEXOL 80 ML: 350 INJECTION, SOLUTION INTRAVENOUS at 22:14

## 2021-07-02 VITALS
DIASTOLIC BLOOD PRESSURE: 64 MMHG | WEIGHT: 262.57 LBS | TEMPERATURE: 98.2 F | BODY MASS INDEX: 48.32 KG/M2 | HEIGHT: 62 IN | HEART RATE: 79 BPM | SYSTOLIC BLOOD PRESSURE: 128 MMHG | RESPIRATION RATE: 23 BRPM | OXYGEN SATURATION: 100 %

## 2021-07-02 LAB
ATRIAL RATE (BPM): 96
P AXIS (DEGREES): 31
PR-INTERVAL (MSEC): 140
QRS-INTERVAL (MSEC): 76
QT-INTERVAL (MSEC): 338
QTC: 427
R AXIS (DEGREES): 12
RAINBOW EXTRA TUBES HOLD SPECIMEN: NORMAL
REPORT TEXT: NORMAL
T AXIS (DEGREES): 27
VENTRICULAR RATE EKG/MIN (BPM): 96

## 2021-07-02 PROCEDURE — 10004651 HB RX, NO CHARGE ITEM

## 2021-07-02 PROCEDURE — 99212 OFFICE O/P EST SF 10 MIN: CPT

## 2021-07-02 PROCEDURE — 59025 FETAL NON-STRESS TEST: CPT

## 2021-07-02 PROCEDURE — 59025 FETAL NON-STRESS TEST: CPT | Performed by: OBSTETRICS & GYNECOLOGY

## 2021-07-02 RX ORDER — ACETAMINOPHEN 500 MG
TABLET ORAL
Status: COMPLETED
Start: 2021-07-02 | End: 2021-07-02

## 2021-07-02 RX ORDER — ACETAMINOPHEN 500 MG
1000 TABLET ORAL ONCE
Status: COMPLETED | OUTPATIENT
Start: 2021-07-02 | End: 2021-07-02

## 2021-07-02 RX ADMIN — ACETAMINOPHEN 1000 MG: 500 TABLET ORAL at 00:38

## 2021-07-02 RX ADMIN — Medication 1000 MG: at 00:38

## 2021-07-09 ENCOUNTER — TELEPHONE (OUTPATIENT)
Dept: CARDIOLOGY | Age: 21
End: 2021-07-09

## 2021-07-28 ENCOUNTER — TELEPHONE (OUTPATIENT)
Dept: CARDIOLOGY | Age: 21
End: 2021-07-28

## 2021-08-12 ENCOUNTER — LAB SERVICES (OUTPATIENT)
Dept: LAB | Age: 21
End: 2021-08-12

## 2021-08-12 ENCOUNTER — OFFICE VISIT (OUTPATIENT)
Dept: CARDIOLOGY | Age: 21
End: 2021-08-12

## 2021-08-12 VITALS
BODY MASS INDEX: 46.74 KG/M2 | HEIGHT: 62 IN | DIASTOLIC BLOOD PRESSURE: 66 MMHG | WEIGHT: 254 LBS | SYSTOLIC BLOOD PRESSURE: 102 MMHG | RESPIRATION RATE: 18 BRPM | OXYGEN SATURATION: 98 % | HEART RATE: 81 BPM

## 2021-08-12 DIAGNOSIS — I47.11 INAPPROPRIATE SINUS NODE TACHYCARDIA: ICD-10-CM

## 2021-08-12 DIAGNOSIS — R00.2 PALPITATIONS: Primary | ICD-10-CM

## 2021-08-12 DIAGNOSIS — E66.01 OBESITY, CLASS III, BMI 40-49.9 (MORBID OBESITY) (CMD): ICD-10-CM

## 2021-08-12 DIAGNOSIS — R00.2 PALPITATIONS: ICD-10-CM

## 2021-08-12 PROCEDURE — 80053 COMPREHEN METABOLIC PANEL: CPT | Performed by: NURSE PRACTITIONER

## 2021-08-12 PROCEDURE — 36415 COLL VENOUS BLD VENIPUNCTURE: CPT | Performed by: NURSE PRACTITIONER

## 2021-08-12 PROCEDURE — 85027 COMPLETE CBC AUTOMATED: CPT | Performed by: NURSE PRACTITIONER

## 2021-08-12 PROCEDURE — 84443 ASSAY THYROID STIM HORMONE: CPT | Performed by: NURSE PRACTITIONER

## 2021-08-12 PROCEDURE — 99214 OFFICE O/P EST MOD 30 MIN: CPT | Performed by: NURSE PRACTITIONER

## 2021-08-12 RX ORDER — METOPROLOL SUCCINATE 25 MG/1
25 TABLET, EXTENDED RELEASE ORAL DAILY
Qty: 90 TABLET | Refills: 1 | Status: SHIPPED | OUTPATIENT
Start: 2021-08-12 | End: 2021-08-23 | Stop reason: SDUPTHER

## 2021-08-12 ASSESSMENT — ENCOUNTER SYMPTOMS
FEVER: 0
DIZZINESS: 0
SLEEP DISTURBANCES DUE TO BREATHING: 0
SHORTNESS OF BREATH: 0
POOR WOUND HEALING: 0
COUGH: 0
BRUISES/BLEEDS EASILY: 0
ALTERED MENTAL STATUS: 0
LIGHT-HEADEDNESS: 0
HEMATEMESIS: 0
ORTHOPNEA: 0
CHILLS: 0
BLURRED VISION: 0
COLOR CHANGE: 0

## 2021-08-13 ENCOUNTER — TELEPHONE (OUTPATIENT)
Dept: CARDIOLOGY | Age: 21
End: 2021-08-13

## 2021-08-13 LAB
ALBUMIN SERPL-MCNC: 3.4 G/DL (ref 3.6–5.1)
ALBUMIN/GLOB SERPL: 0.9 {RATIO} (ref 1–2.4)
ALP SERPL-CCNC: 109 UNITS/L (ref 45–117)
ALT SERPL-CCNC: 21 UNITS/L
ANION GAP SERPL CALC-SCNC: 9 MMOL/L (ref 10–20)
AST SERPL-CCNC: 17 UNITS/L
BILIRUB SERPL-MCNC: 0.4 MG/DL (ref 0.2–1)
BUN SERPL-MCNC: 11 MG/DL (ref 6–20)
BUN/CREAT SERPL: 14 (ref 7–25)
CALCIUM SERPL-MCNC: 9.5 MG/DL (ref 8.4–10.2)
CHLORIDE SERPL-SCNC: 108 MMOL/L (ref 98–107)
CO2 SERPL-SCNC: 27 MMOL/L (ref 21–32)
CREAT SERPL-MCNC: 0.8 MG/DL (ref 0.51–0.95)
DEPRECATED RDW RBC: 47.9 FL (ref 39–50)
ERYTHROCYTE [DISTWIDTH] IN BLOOD: 15.6 % (ref 11–15)
FASTING DURATION TIME PATIENT: ABNORMAL H
GFR SERPLBLD BASED ON 1.73 SQ M-ARVRAT: >90 ML/MIN/1.73M2
GLOBULIN SER-MCNC: 3.8 G/DL (ref 2–4)
GLUCOSE SERPL-MCNC: 99 MG/DL (ref 65–99)
HCT VFR BLD CALC: 41.9 % (ref 36–46.5)
HGB BLD-MCNC: 12.5 G/DL (ref 12–15.5)
MCH RBC QN AUTO: 25.2 PG (ref 26–34)
MCHC RBC AUTO-ENTMCNC: 29.8 G/DL (ref 32–36.5)
MCV RBC AUTO: 84.3 FL (ref 78–100)
NRBC BLD MANUAL-RTO: 0 /100 WBC
PLATELET # BLD AUTO: 331 K/MCL (ref 140–450)
POTASSIUM SERPL-SCNC: 4.6 MMOL/L (ref 3.4–5.1)
PROT SERPL-MCNC: 7.2 G/DL (ref 6.4–8.2)
RBC # BLD: 4.97 MIL/MCL (ref 4–5.2)
SODIUM SERPL-SCNC: 139 MMOL/L (ref 135–145)
TSH SERPL-ACNC: 1.24 MCUNITS/ML (ref 0.35–5)
WBC # BLD: 10.1 K/MCL (ref 4.2–11)

## 2021-08-17 ENCOUNTER — TELEPHONE (OUTPATIENT)
Dept: CARDIOLOGY | Age: 21
End: 2021-08-17

## 2021-08-17 DIAGNOSIS — I95.1 ORTHOSTATIC HYPOTENSION: ICD-10-CM

## 2021-08-17 DIAGNOSIS — R00.0 SINUS TACHYCARDIA: ICD-10-CM

## 2021-08-17 DIAGNOSIS — R00.2 PALPITATIONS: Primary | ICD-10-CM

## 2021-08-18 ENCOUNTER — ANCILLARY PROCEDURE (OUTPATIENT)
Dept: CARDIOLOGY | Age: 21
End: 2021-08-18
Attending: INTERNAL MEDICINE

## 2021-08-18 DIAGNOSIS — R00.2 PALPITATIONS: ICD-10-CM

## 2021-08-18 DIAGNOSIS — R00.0 SINUS TACHYCARDIA: ICD-10-CM

## 2021-08-18 DIAGNOSIS — I95.1 ORTHOSTATIC HYPOTENSION: ICD-10-CM

## 2021-08-23 ENCOUNTER — OFFICE VISIT (OUTPATIENT)
Dept: CARDIOLOGY | Age: 21
End: 2021-08-23

## 2021-08-23 VITALS
WEIGHT: 264 LBS | DIASTOLIC BLOOD PRESSURE: 74 MMHG | HEIGHT: 62 IN | HEART RATE: 110 BPM | SYSTOLIC BLOOD PRESSURE: 108 MMHG | BODY MASS INDEX: 48.58 KG/M2

## 2021-08-23 DIAGNOSIS — E66.01 OBESITY, CLASS III, BMI 40-49.9 (MORBID OBESITY) (CMD): ICD-10-CM

## 2021-08-23 DIAGNOSIS — I47.11 INAPPROPRIATE SINUS TACHYCARDIA: Primary | ICD-10-CM

## 2021-08-23 DIAGNOSIS — R00.2 PALPITATIONS: ICD-10-CM

## 2021-08-23 PROCEDURE — 99214 OFFICE O/P EST MOD 30 MIN: CPT | Performed by: INTERNAL MEDICINE

## 2021-08-23 RX ORDER — IBUPROFEN 800 MG/1
800 TABLET ORAL
COMMUNITY
Start: 2021-08-21 | End: 2022-06-03

## 2021-08-23 RX ORDER — METOPROLOL SUCCINATE 50 MG/1
50 TABLET, EXTENDED RELEASE ORAL DAILY
Qty: 90 TABLET | Refills: 3 | Status: SHIPPED | OUTPATIENT
Start: 2021-08-23 | End: 2021-12-03 | Stop reason: SDUPTHER

## 2021-08-23 RX ORDER — CYCLOBENZAPRINE HCL 10 MG
10 TABLET ORAL
COMMUNITY
Start: 2021-08-21 | End: 2021-09-04

## 2021-08-23 ASSESSMENT — PATIENT HEALTH QUESTIONNAIRE - PHQ9
1. LITTLE INTEREST OR PLEASURE IN DOING THINGS: NOT AT ALL
SUM OF ALL RESPONSES TO PHQ9 QUESTIONS 1 AND 2: 0
2. FEELING DOWN, DEPRESSED OR HOPELESS: NOT AT ALL
CLINICAL INTERPRETATION OF PHQ2 SCORE: NO FURTHER SCREENING NEEDED
CLINICAL INTERPRETATION OF PHQ9 SCORE: NO FURTHER SCREENING NEEDED
SUM OF ALL RESPONSES TO PHQ9 QUESTIONS 1 AND 2: 0

## 2021-08-24 PROCEDURE — 93227 XTRNL ECG REC<48 HR R&I: CPT | Performed by: INTERNAL MEDICINE

## 2021-08-26 ENCOUNTER — TELEPHONE (OUTPATIENT)
Dept: CARDIOLOGY | Age: 21
End: 2021-08-26

## 2021-09-07 ENCOUNTER — HOSPITAL ENCOUNTER (OUTPATIENT)
Dept: CARDIOLOGY | Age: 21
Discharge: HOME | End: 2021-09-07
Attending: INTERNAL MEDICINE

## 2021-09-07 VITALS — SYSTOLIC BLOOD PRESSURE: 149 MMHG | HEART RATE: 78 BPM | DIASTOLIC BLOOD PRESSURE: 85 MMHG

## 2021-09-07 DIAGNOSIS — I47.11 INAPPROPRIATE SINUS TACHYCARDIA: ICD-10-CM

## 2021-09-07 DIAGNOSIS — R00.2 PALPITATIONS: ICD-10-CM

## 2021-09-07 PROCEDURE — 93660 TILT TABLE EVALUATION: CPT | Performed by: INTERNAL MEDICINE

## 2021-09-07 PROCEDURE — 93660 TILT TABLE EVALUATION: CPT

## 2021-09-07 PROCEDURE — 10002807 HB RX 258

## 2021-09-07 RX ORDER — SODIUM CHLORIDE 9 MG/ML
INJECTION, SOLUTION INTRAVENOUS
Status: COMPLETED
Start: 2021-09-07 | End: 2021-09-07

## 2021-09-07 RX ADMIN — SODIUM CHLORIDE 250 ML: 9 INJECTION, SOLUTION INTRAVENOUS at 09:08

## 2021-09-20 ENCOUNTER — APPOINTMENT (OUTPATIENT)
Dept: CARDIOLOGY | Age: 21
End: 2021-09-20
Attending: FAMILY MEDICINE

## 2021-09-22 ENCOUNTER — OFFICE VISIT (OUTPATIENT)
Dept: CARDIOLOGY | Age: 21
End: 2021-09-22

## 2021-09-22 VITALS
HEART RATE: 58 BPM | BODY MASS INDEX: 48.63 KG/M2 | WEIGHT: 265.88 LBS | OXYGEN SATURATION: 99 % | SYSTOLIC BLOOD PRESSURE: 98 MMHG | DIASTOLIC BLOOD PRESSURE: 60 MMHG

## 2021-09-22 DIAGNOSIS — R00.2 PALPITATIONS: ICD-10-CM

## 2021-09-22 PROCEDURE — 99213 OFFICE O/P EST LOW 20 MIN: CPT | Performed by: INTERNAL MEDICINE

## 2021-09-22 PROCEDURE — 93000 ELECTROCARDIOGRAM COMPLETE: CPT

## 2021-09-22 ASSESSMENT — PATIENT HEALTH QUESTIONNAIRE - PHQ9
SUM OF ALL RESPONSES TO PHQ9 QUESTIONS 1 AND 2: 0
1. LITTLE INTEREST OR PLEASURE IN DOING THINGS: NOT AT ALL
CLINICAL INTERPRETATION OF PHQ9 SCORE: NO FURTHER SCREENING NEEDED
2. FEELING DOWN, DEPRESSED OR HOPELESS: NOT AT ALL
SUM OF ALL RESPONSES TO PHQ9 QUESTIONS 1 AND 2: 0
CLINICAL INTERPRETATION OF PHQ2 SCORE: NO FURTHER SCREENING NEEDED

## 2021-09-22 ASSESSMENT — PAIN SCALES - GENERAL: PAINLEVEL: 0

## 2021-10-01 ENCOUNTER — TELEPHONE (OUTPATIENT)
Dept: CARDIOLOGY | Age: 21
End: 2021-10-01

## 2021-12-01 ENCOUNTER — OFFICE VISIT (OUTPATIENT)
Dept: CARDIOLOGY | Age: 21
End: 2021-12-01

## 2021-12-01 VITALS
WEIGHT: 272 LBS | DIASTOLIC BLOOD PRESSURE: 66 MMHG | SYSTOLIC BLOOD PRESSURE: 110 MMHG | BODY MASS INDEX: 49.75 KG/M2 | HEART RATE: 64 BPM

## 2021-12-01 DIAGNOSIS — I47.11 INAPPROPRIATE SINUS TACHYCARDIA: ICD-10-CM

## 2021-12-01 DIAGNOSIS — E66.01 MORBID OBESITY (CMD): ICD-10-CM

## 2021-12-01 DIAGNOSIS — R00.2 PALPITATIONS: Primary | ICD-10-CM

## 2021-12-01 LAB — HPV16+18+45 E6+E7MRNA CVX NAA+PROBE: NEGATIVE

## 2021-12-01 PROCEDURE — 99214 OFFICE O/P EST MOD 30 MIN: CPT | Performed by: INTERNAL MEDICINE

## 2021-12-01 RX ORDER — NORETHINDRONE ACETATE AND ETHINYL ESTRADIOL 1MG-20(21)
1 KIT ORAL DAILY
COMMUNITY
Start: 2021-11-15 | End: 2022-06-03

## 2021-12-02 LAB — CYTOLOGY CVX/VAG DOC THIN PREP: NORMAL

## 2021-12-03 DIAGNOSIS — R00.2 PALPITATIONS: ICD-10-CM

## 2021-12-03 RX ORDER — METOPROLOL SUCCINATE 50 MG/1
50 TABLET, EXTENDED RELEASE ORAL DAILY
Qty: 10 TABLET | Refills: 0 | Status: SHIPPED | OUTPATIENT
Start: 2021-12-03 | End: 2022-06-03

## 2021-12-22 ENCOUNTER — NURSE TRIAGE (OUTPATIENT)
Dept: SCHEDULING | Age: 21
End: 2021-12-22

## 2021-12-22 ENCOUNTER — APPOINTMENT (OUTPATIENT)
Dept: GENERAL RADIOLOGY | Age: 21
End: 2021-12-22
Attending: EMERGENCY MEDICINE

## 2021-12-22 ENCOUNTER — HOSPITAL ENCOUNTER (EMERGENCY)
Age: 21
Discharge: LEFT BEFORE DISCHARGE | End: 2021-12-23

## 2021-12-22 LAB
ALBUMIN SERPL-MCNC: 3.1 G/DL (ref 3.6–5.1)
ALBUMIN/GLOB SERPL: 0.6 {RATIO} (ref 1–2.4)
ALP SERPL-CCNC: 81 UNITS/L (ref 45–117)
ALT SERPL-CCNC: 18 UNITS/L
ANION GAP SERPL CALC-SCNC: 10 MMOL/L (ref 10–20)
AST SERPL-CCNC: 13 UNITS/L
BASOPHILS # BLD: 0.1 K/MCL (ref 0–0.3)
BASOPHILS NFR BLD: 1 %
BILIRUB SERPL-MCNC: 0.3 MG/DL (ref 0.2–1)
BUN SERPL-MCNC: 7 MG/DL (ref 6–20)
BUN/CREAT SERPL: 9 (ref 7–25)
CALCIUM SERPL-MCNC: 9.1 MG/DL (ref 8.4–10.2)
CHLORIDE SERPL-SCNC: 106 MMOL/L (ref 98–107)
CO2 SERPL-SCNC: 25 MMOL/L (ref 21–32)
CREAT SERPL-MCNC: 0.81 MG/DL (ref 0.51–0.95)
DEPRECATED RDW RBC: 47.4 FL (ref 39–50)
EOSINOPHIL # BLD: 0.1 K/MCL (ref 0–0.5)
EOSINOPHIL NFR BLD: 2 %
ERYTHROCYTE [DISTWIDTH] IN BLOOD: 17.1 % (ref 11–15)
FASTING DURATION TIME PATIENT: ABNORMAL H
GFR SERPLBLD BASED ON 1.73 SQ M-ARVRAT: >90 ML/MIN
GLOBULIN SER-MCNC: 4.9 G/DL (ref 2–4)
GLUCOSE SERPL-MCNC: 95 MG/DL (ref 70–99)
HCT VFR BLD CALC: 44.1 % (ref 36–46.5)
HGB BLD-MCNC: 14 G/DL (ref 12–15.5)
IMM GRANULOCYTES # BLD AUTO: 0 K/MCL (ref 0–0.2)
IMM GRANULOCYTES # BLD: 0 %
LYMPHOCYTES # BLD: 2.1 K/MCL (ref 1–4.8)
LYMPHOCYTES NFR BLD: 26 %
MCH RBC QN AUTO: 25.1 PG (ref 26–34)
MCHC RBC AUTO-ENTMCNC: 31.7 G/DL (ref 32–36.5)
MCV RBC AUTO: 79 FL (ref 78–100)
MONOCYTES # BLD: 0.7 K/MCL (ref 0.3–0.9)
MONOCYTES NFR BLD: 8 %
NEUTROPHILS # BLD: 5.2 K/MCL (ref 1.8–7.7)
NEUTROPHILS NFR BLD: 63 %
NRBC BLD MANUAL-RTO: 0 /100 WBC
PLATELET # BLD AUTO: 293 K/MCL (ref 140–450)
POTASSIUM SERPL-SCNC: 4.3 MMOL/L (ref 3.4–5.1)
PROT SERPL-MCNC: 8 G/DL (ref 6.4–8.2)
RBC # BLD: 5.58 MIL/MCL (ref 4–5.2)
SODIUM SERPL-SCNC: 137 MMOL/L (ref 135–145)
TROPONIN I SERPL DL<=0.01 NG/ML-MCNC: <4 NG/L
WBC # BLD: 8.2 K/MCL (ref 4.2–11)

## 2021-12-22 PROCEDURE — 84484 ASSAY OF TROPONIN QUANT: CPT | Performed by: EMERGENCY MEDICINE

## 2021-12-22 PROCEDURE — 85025 COMPLETE CBC W/AUTO DIFF WBC: CPT | Performed by: EMERGENCY MEDICINE

## 2021-12-22 PROCEDURE — 93005 ELECTROCARDIOGRAM TRACING: CPT | Performed by: EMERGENCY MEDICINE

## 2021-12-22 PROCEDURE — 80053 COMPREHEN METABOLIC PANEL: CPT | Performed by: EMERGENCY MEDICINE

## 2021-12-22 PROCEDURE — 93010 ELECTROCARDIOGRAM REPORT: CPT | Performed by: INTERNAL MEDICINE

## 2021-12-22 PROCEDURE — 10003627 HB COUNTER ED NO SERVICE

## 2021-12-22 PROCEDURE — 71046 X-RAY EXAM CHEST 2 VIEWS: CPT

## 2021-12-23 ENCOUNTER — NURSE TRIAGE (OUTPATIENT)
Dept: SCHEDULING | Age: 21
End: 2021-12-23

## 2021-12-23 VITALS
OXYGEN SATURATION: 95 % | RESPIRATION RATE: 15 BRPM | TEMPERATURE: 98.6 F | SYSTOLIC BLOOD PRESSURE: 127 MMHG | DIASTOLIC BLOOD PRESSURE: 87 MMHG | HEART RATE: 114 BPM

## 2021-12-23 LAB
ATRIAL RATE (BPM): 118
P AXIS (DEGREES): 19
PR-INTERVAL (MSEC): 142
QRS-INTERVAL (MSEC): 72
QT-INTERVAL (MSEC): 288
QTC: 403
R AXIS (DEGREES): 20
REPORT TEXT: NORMAL
T AXIS (DEGREES): 37
VENTRICULAR RATE EKG/MIN (BPM): 118

## 2022-01-13 ENCOUNTER — E-ADVICE (OUTPATIENT)
Dept: INTERNAL MEDICINE | Age: 22
End: 2022-01-13

## 2022-01-13 DIAGNOSIS — N92.6 MISSED MENSES: Primary | ICD-10-CM

## 2022-01-14 ENCOUNTER — LAB SERVICES (OUTPATIENT)
Dept: LAB | Age: 22
End: 2022-01-14

## 2022-01-14 DIAGNOSIS — N92.6 MISSED MENSES: ICD-10-CM

## 2022-01-14 PROCEDURE — 36415 COLL VENOUS BLD VENIPUNCTURE: CPT | Performed by: PSYCHIATRY & NEUROLOGY

## 2022-01-14 PROCEDURE — 84703 CHORIONIC GONADOTROPIN ASSAY: CPT | Performed by: PSYCHIATRY & NEUROLOGY

## 2022-01-15 LAB — HCG SERPL QL: NEGATIVE

## 2022-01-17 ENCOUNTER — TELEPHONE (OUTPATIENT)
Dept: INTERNAL MEDICINE | Age: 22
End: 2022-01-17

## 2022-02-23 ENCOUNTER — LAB SERVICES (OUTPATIENT)
Dept: LAB | Age: 22
End: 2022-02-23

## 2022-02-23 ENCOUNTER — OFFICE VISIT (OUTPATIENT)
Dept: INTERNAL MEDICINE | Age: 22
End: 2022-02-23

## 2022-02-23 VITALS
DIASTOLIC BLOOD PRESSURE: 68 MMHG | WEIGHT: 276 LBS | BODY MASS INDEX: 50.79 KG/M2 | RESPIRATION RATE: 16 BRPM | OXYGEN SATURATION: 98 % | HEIGHT: 62 IN | HEART RATE: 68 BPM | SYSTOLIC BLOOD PRESSURE: 124 MMHG

## 2022-02-23 DIAGNOSIS — I47.11 INAPPROPRIATE SINUS TACHYCARDIA: ICD-10-CM

## 2022-02-23 DIAGNOSIS — E66.01 CLASS 3 SEVERE OBESITY DUE TO EXCESS CALORIES WITH BODY MASS INDEX (BMI) OF 50.0 TO 59.9 IN ADULT, UNSPECIFIED WHETHER SERIOUS COMORBIDITY PRESENT (CMD): Primary | ICD-10-CM

## 2022-02-23 DIAGNOSIS — E66.01 CLASS 3 SEVERE OBESITY DUE TO EXCESS CALORIES WITH BODY MASS INDEX (BMI) OF 50.0 TO 59.9 IN ADULT, UNSPECIFIED WHETHER SERIOUS COMORBIDITY PRESENT (CMD): ICD-10-CM

## 2022-02-23 PROBLEM — Z3A.08 8 WEEKS GESTATION OF PREGNANCY: Status: RESOLVED | Noted: 2020-03-06 | Resolved: 2022-02-23

## 2022-02-23 PROCEDURE — 80050 GENERAL HEALTH PANEL: CPT | Performed by: PSYCHIATRY & NEUROLOGY

## 2022-02-23 PROCEDURE — 83036 HEMOGLOBIN GLYCOSYLATED A1C: CPT | Performed by: PSYCHIATRY & NEUROLOGY

## 2022-02-23 PROCEDURE — 36415 COLL VENOUS BLD VENIPUNCTURE: CPT | Performed by: FAMILY MEDICINE

## 2022-02-23 PROCEDURE — 80061 LIPID PANEL: CPT | Performed by: PSYCHIATRY & NEUROLOGY

## 2022-02-23 PROCEDURE — 99214 OFFICE O/P EST MOD 30 MIN: CPT | Performed by: FAMILY MEDICINE

## 2022-02-23 ASSESSMENT — PATIENT HEALTH QUESTIONNAIRE - PHQ9
2. FEELING DOWN, DEPRESSED OR HOPELESS: NOT AT ALL
CLINICAL INTERPRETATION OF PHQ2 SCORE: NO FURTHER SCREENING NEEDED
1. LITTLE INTEREST OR PLEASURE IN DOING THINGS: NOT AT ALL
SUM OF ALL RESPONSES TO PHQ9 QUESTIONS 1 AND 2: 0
SUM OF ALL RESPONSES TO PHQ9 QUESTIONS 1 AND 2: 0

## 2022-02-24 LAB
ALBUMIN SERPL-MCNC: 3.5 G/DL (ref 3.6–5.1)
ALBUMIN/GLOB SERPL: 0.8 {RATIO} (ref 1–2.4)
ALP SERPL-CCNC: 89 UNITS/L (ref 45–117)
ALT SERPL-CCNC: 31 UNITS/L
ANION GAP SERPL CALC-SCNC: 7 MMOL/L (ref 10–20)
AST SERPL-CCNC: 18 UNITS/L
BASOPHILS # BLD: 0.1 K/MCL (ref 0–0.3)
BASOPHILS NFR BLD: 1 %
BILIRUB SERPL-MCNC: 0.4 MG/DL (ref 0.2–1)
BUN SERPL-MCNC: 10 MG/DL (ref 6–20)
BUN/CREAT SERPL: 14 (ref 7–25)
CALCIUM SERPL-MCNC: 9.5 MG/DL (ref 8.4–10.2)
CHLORIDE SERPL-SCNC: 108 MMOL/L (ref 98–107)
CHOLEST SERPL-MCNC: 179 MG/DL
CHOLEST/HDLC SERPL: 4.1 {RATIO}
CO2 SERPL-SCNC: 28 MMOL/L (ref 21–32)
CREAT SERPL-MCNC: 0.71 MG/DL (ref 0.51–0.95)
DEPRECATED RDW RBC: 47 FL (ref 39–50)
EOSINOPHIL # BLD: 0.3 K/MCL (ref 0–0.5)
EOSINOPHIL NFR BLD: 3 %
ERYTHROCYTE [DISTWIDTH] IN BLOOD: 15.6 % (ref 11–15)
FASTING DURATION TIME PATIENT: ABNORMAL H
FASTING DURATION TIME PATIENT: ABNORMAL H
GFR SERPLBLD BASED ON 1.73 SQ M-ARVRAT: >90 ML/MIN
GLOBULIN SER-MCNC: 4.3 G/DL (ref 2–4)
GLUCOSE SERPL-MCNC: 87 MG/DL (ref 70–99)
HBA1C MFR BLD: 5.4 % (ref 4.5–5.6)
HCT VFR BLD CALC: 45 % (ref 36–46.5)
HDLC SERPL-MCNC: 44 MG/DL
HGB BLD-MCNC: 14 G/DL (ref 12–15.5)
IMM GRANULOCYTES # BLD AUTO: 0 K/MCL (ref 0–0.2)
IMM GRANULOCYTES # BLD: 0 %
LDLC SERPL CALC-MCNC: 117 MG/DL
LYMPHOCYTES # BLD: 3.5 K/MCL (ref 1–4.8)
LYMPHOCYTES NFR BLD: 37 %
MCH RBC QN AUTO: 25.6 PG (ref 26–34)
MCHC RBC AUTO-ENTMCNC: 31.1 G/DL (ref 32–36.5)
MCV RBC AUTO: 82.4 FL (ref 78–100)
MONOCYTES # BLD: 0.4 K/MCL (ref 0.3–0.9)
MONOCYTES NFR BLD: 4 %
NEUTROPHILS # BLD: 5.2 K/MCL (ref 1.8–7.7)
NEUTROPHILS NFR BLD: 55 %
NONHDLC SERPL-MCNC: 135 MG/DL
NRBC BLD MANUAL-RTO: 0 /100 WBC
PLATELET # BLD AUTO: 407 K/MCL (ref 140–450)
POTASSIUM SERPL-SCNC: 4.4 MMOL/L (ref 3.4–5.1)
PROT SERPL-MCNC: 7.8 G/DL (ref 6.4–8.2)
RBC # BLD: 5.46 MIL/MCL (ref 4–5.2)
SODIUM SERPL-SCNC: 139 MMOL/L (ref 135–145)
TRIGL SERPL-MCNC: 91 MG/DL
TSH SERPL-ACNC: 1.64 MCUNITS/ML (ref 0.35–5)
WBC # BLD: 9.5 K/MCL (ref 4.2–11)

## 2022-02-25 ENCOUNTER — TELEPHONE (OUTPATIENT)
Dept: INTERNAL MEDICINE | Age: 22
End: 2022-02-25

## 2022-03-01 ASSESSMENT — ENCOUNTER SYMPTOMS
RESPIRATORY NEGATIVE: 1
CONSTITUTIONAL NEGATIVE: 1

## 2022-03-09 ENCOUNTER — OFFICE VISIT (OUTPATIENT)
Dept: CARDIOLOGY | Age: 22
End: 2022-03-09

## 2022-03-09 VITALS
TEMPERATURE: 97 F | DIASTOLIC BLOOD PRESSURE: 60 MMHG | OXYGEN SATURATION: 96 % | SYSTOLIC BLOOD PRESSURE: 112 MMHG | RESPIRATION RATE: 18 BRPM | WEIGHT: 271.83 LBS | BODY MASS INDEX: 49.72 KG/M2 | HEART RATE: 90 BPM

## 2022-03-09 DIAGNOSIS — I47.11 INAPPROPRIATE SINUS TACHYCARDIA: Primary | ICD-10-CM

## 2022-03-09 DIAGNOSIS — R00.2 PALPITATIONS: ICD-10-CM

## 2022-03-09 PROCEDURE — 99212 OFFICE O/P EST SF 10 MIN: CPT | Performed by: INTERNAL MEDICINE

## 2022-03-09 PROCEDURE — 93000 ELECTROCARDIOGRAM COMPLETE: CPT | Performed by: INTERNAL MEDICINE

## 2022-03-09 ASSESSMENT — PATIENT HEALTH QUESTIONNAIRE - PHQ9
2. FEELING DOWN, DEPRESSED OR HOPELESS: NOT AT ALL
SUM OF ALL RESPONSES TO PHQ9 QUESTIONS 1 AND 2: 0
1. LITTLE INTEREST OR PLEASURE IN DOING THINGS: NOT AT ALL
SUM OF ALL RESPONSES TO PHQ9 QUESTIONS 1 AND 2: 0
CLINICAL INTERPRETATION OF PHQ2 SCORE: NO FURTHER SCREENING NEEDED

## 2022-03-09 ASSESSMENT — PAIN SCALES - GENERAL: PAINLEVEL: 0

## 2022-03-15 ENCOUNTER — TELEPHONE (OUTPATIENT)
Dept: SCHEDULING | Age: 22
End: 2022-03-15

## 2022-03-23 ENCOUNTER — OFFICE VISIT (OUTPATIENT)
Dept: CHRONIC DISEASE MANAGEMENT | Age: 22
End: 2022-03-23
Attending: FAMILY MEDICINE

## 2022-03-23 VITALS — WEIGHT: 269.73 LBS | BODY MASS INDEX: 49.64 KG/M2 | HEIGHT: 62 IN

## 2022-03-23 DIAGNOSIS — E66.01 CLASS 3 SEVERE OBESITY DUE TO EXCESS CALORIES WITH BODY MASS INDEX (BMI) OF 50.0 TO 59.9 IN ADULT, UNSPECIFIED WHETHER SERIOUS COMORBIDITY PRESENT (CMD): ICD-10-CM

## 2022-03-23 DIAGNOSIS — E66.9 OBESITY: Primary | ICD-10-CM

## 2022-03-23 PROCEDURE — X1094 NO CHARGE VISIT: HCPCS | Performed by: DIETITIAN, REGISTERED

## 2022-04-05 ENCOUNTER — E-ADVICE (OUTPATIENT)
Dept: INTERNAL MEDICINE | Age: 22
End: 2022-04-05

## 2022-04-05 DIAGNOSIS — E66.01 CLASS 3 SEVERE OBESITY DUE TO EXCESS CALORIES WITH BODY MASS INDEX (BMI) OF 50.0 TO 59.9 IN ADULT, UNSPECIFIED WHETHER SERIOUS COMORBIDITY PRESENT (CMD): Primary | ICD-10-CM

## 2022-04-07 RX ORDER — SEMAGLUTIDE 1 MG/.5ML
1 INJECTION, SOLUTION SUBCUTANEOUS
Qty: 2 ML | Refills: 1 | Status: SHIPPED | OUTPATIENT
Start: 2022-04-07 | End: 2022-04-20 | Stop reason: SDUPTHER

## 2022-04-20 ENCOUNTER — OFFICE VISIT (OUTPATIENT)
Dept: INTERNAL MEDICINE | Age: 22
End: 2022-04-20

## 2022-04-20 ENCOUNTER — LAB SERVICES (OUTPATIENT)
Dept: LAB | Age: 22
End: 2022-04-20

## 2022-04-20 ENCOUNTER — APPOINTMENT (OUTPATIENT)
Dept: INTERNAL MEDICINE | Age: 22
End: 2022-04-20

## 2022-04-20 VITALS
HEART RATE: 74 BPM | HEIGHT: 62 IN | OXYGEN SATURATION: 97 % | BODY MASS INDEX: 48.16 KG/M2 | WEIGHT: 261.7 LBS | SYSTOLIC BLOOD PRESSURE: 120 MMHG | DIASTOLIC BLOOD PRESSURE: 72 MMHG

## 2022-04-20 DIAGNOSIS — E66.01 CLASS 3 SEVERE OBESITY DUE TO EXCESS CALORIES WITH BODY MASS INDEX (BMI) OF 45.0 TO 49.9 IN ADULT, UNSPECIFIED WHETHER SERIOUS COMORBIDITY PRESENT (CMD): Primary | ICD-10-CM

## 2022-04-20 DIAGNOSIS — E66.01 CLASS 3 SEVERE OBESITY DUE TO EXCESS CALORIES WITH BODY MASS INDEX (BMI) OF 45.0 TO 49.9 IN ADULT, UNSPECIFIED WHETHER SERIOUS COMORBIDITY PRESENT (CMD): ICD-10-CM

## 2022-04-20 DIAGNOSIS — E28.2 PCOS (POLYCYSTIC OVARIAN SYNDROME): ICD-10-CM

## 2022-04-20 PROBLEM — E66.813 CLASS 3 SEVERE OBESITY DUE TO EXCESS CALORIES WITH BODY MASS INDEX (BMI) OF 45.0 TO 49.9 IN ADULT (CMD): Status: ACTIVE | Noted: 2021-04-20

## 2022-04-20 LAB
ANION GAP SERPL CALC-SCNC: 9 MMOL/L (ref 10–20)
BUN SERPL-MCNC: 9 MG/DL (ref 6–20)
BUN/CREAT SERPL: 12 (ref 7–25)
CALCIUM SERPL-MCNC: 9.3 MG/DL (ref 8.4–10.2)
CHLORIDE SERPL-SCNC: 111 MMOL/L (ref 98–107)
CO2 SERPL-SCNC: 27 MMOL/L (ref 21–32)
CREAT SERPL-MCNC: 0.78 MG/DL (ref 0.51–0.95)
FASTING DURATION TIME PATIENT: ABNORMAL H
GFR SERPLBLD BASED ON 1.73 SQ M-ARVRAT: >90 ML/MIN
GLUCOSE SERPL-MCNC: 81 MG/DL (ref 70–99)
POTASSIUM SERPL-SCNC: 4.3 MMOL/L (ref 3.4–5.1)
SODIUM SERPL-SCNC: 143 MMOL/L (ref 135–145)

## 2022-04-20 PROCEDURE — 80048 BASIC METABOLIC PNL TOTAL CA: CPT | Performed by: INTERNAL MEDICINE

## 2022-04-20 PROCEDURE — 36415 COLL VENOUS BLD VENIPUNCTURE: CPT | Performed by: INTERNAL MEDICINE

## 2022-04-20 PROCEDURE — 99214 OFFICE O/P EST MOD 30 MIN: CPT | Performed by: FAMILY MEDICINE

## 2022-04-20 RX ORDER — ONDANSETRON 4 MG/1
4 TABLET, ORALLY DISINTEGRATING ORAL EVERY 8 HOURS PRN
Qty: 15 TABLET | Refills: 3 | Status: SHIPPED | OUTPATIENT
Start: 2022-04-20 | End: 2022-06-03

## 2022-04-20 RX ORDER — NORETHINDRONE ACETATE AND ETHINYL ESTRADIOL 1; .02 MG/1; MG/1
TABLET ORAL DAILY
COMMUNITY
End: 2022-06-03

## 2022-04-20 RX ORDER — METOPROLOL SUCCINATE 50 MG/1
TABLET, EXTENDED RELEASE ORAL DAILY
COMMUNITY
End: 2022-06-03

## 2022-04-20 RX ORDER — SEMAGLUTIDE 1 MG/.5ML
1 INJECTION, SOLUTION SUBCUTANEOUS
Qty: 2 ML | Refills: 1 | Status: SHIPPED | OUTPATIENT
Start: 2022-04-20 | End: 2022-05-09 | Stop reason: DRUGHIGH

## 2022-04-20 ASSESSMENT — PATIENT HEALTH QUESTIONNAIRE - PHQ9
1. LITTLE INTEREST OR PLEASURE IN DOING THINGS: NOT AT ALL
2. FEELING DOWN, DEPRESSED OR HOPELESS: NOT AT ALL
CLINICAL INTERPRETATION OF PHQ2 SCORE: NO FURTHER SCREENING NEEDED
SUM OF ALL RESPONSES TO PHQ9 QUESTIONS 1 AND 2: 0
SUM OF ALL RESPONSES TO PHQ9 QUESTIONS 1 AND 2: 0

## 2022-04-21 ENCOUNTER — TELEPHONE (OUTPATIENT)
Dept: INTERNAL MEDICINE | Age: 22
End: 2022-04-21

## 2022-04-26 ASSESSMENT — ENCOUNTER SYMPTOMS
RESPIRATORY NEGATIVE: 1
CONSTITUTIONAL NEGATIVE: 1

## 2022-05-09 ENCOUNTER — LAB SERVICES (OUTPATIENT)
Dept: LAB | Age: 22
End: 2022-05-09

## 2022-05-09 ENCOUNTER — OFFICE VISIT (OUTPATIENT)
Dept: ENDOCRINOLOGY | Age: 22
End: 2022-05-09
Attending: FAMILY MEDICINE

## 2022-05-09 VITALS
HEIGHT: 62 IN | SYSTOLIC BLOOD PRESSURE: 110 MMHG | HEART RATE: 62 BPM | WEIGHT: 259.59 LBS | DIASTOLIC BLOOD PRESSURE: 72 MMHG | BODY MASS INDEX: 47.77 KG/M2

## 2022-05-09 DIAGNOSIS — E28.2 PCOS (POLYCYSTIC OVARIAN SYNDROME): Primary | ICD-10-CM

## 2022-05-09 DIAGNOSIS — E28.2 PCOS (POLYCYSTIC OVARIAN SYNDROME): ICD-10-CM

## 2022-05-09 DIAGNOSIS — E66.01 CLASS 3 SEVERE OBESITY DUE TO EXCESS CALORIES WITHOUT SERIOUS COMORBIDITY WITH BODY MASS INDEX (BMI) OF 45.0 TO 49.9 IN ADULT (CMD): ICD-10-CM

## 2022-05-09 PROCEDURE — 83002 ASSAY OF GONADOTROPIN (LH): CPT | Performed by: INTERNAL MEDICINE

## 2022-05-09 PROCEDURE — 82670 ASSAY OF TOTAL ESTRADIOL: CPT | Performed by: INTERNAL MEDICINE

## 2022-05-09 PROCEDURE — 84403 ASSAY OF TOTAL TESTOSTERONE: CPT | Performed by: INTERNAL MEDICINE

## 2022-05-09 PROCEDURE — 83001 ASSAY OF GONADOTROPIN (FSH): CPT | Performed by: INTERNAL MEDICINE

## 2022-05-09 PROCEDURE — 99204 OFFICE O/P NEW MOD 45 MIN: CPT | Performed by: INTERNAL MEDICINE

## 2022-05-09 PROCEDURE — 36415 COLL VENOUS BLD VENIPUNCTURE: CPT | Performed by: INTERNAL MEDICINE

## 2022-05-09 PROCEDURE — 83498 ASY HYDROXYPROGESTERONE 17-D: CPT | Performed by: INTERNAL MEDICINE

## 2022-05-09 PROCEDURE — 84702 CHORIONIC GONADOTROPIN TEST: CPT | Performed by: INTERNAL MEDICINE

## 2022-05-09 PROCEDURE — 80048 BASIC METABOLIC PNL TOTAL CA: CPT | Performed by: INTERNAL MEDICINE

## 2022-05-09 PROCEDURE — 84146 ASSAY OF PROLACTIN: CPT | Performed by: INTERNAL MEDICINE

## 2022-05-09 PROCEDURE — 82157 ASSAY OF ANDROSTENEDIONE: CPT | Performed by: INTERNAL MEDICINE

## 2022-05-09 RX ORDER — SEMAGLUTIDE 1.7 MG/.75ML
1.7 INJECTION, SOLUTION SUBCUTANEOUS
Qty: 3 ML | Refills: 2 | Status: SHIPPED | OUTPATIENT
Start: 2022-05-09 | End: 2022-06-28 | Stop reason: SDUPTHER

## 2022-05-09 ASSESSMENT — PATIENT HEALTH QUESTIONNAIRE - PHQ9
SUM OF ALL RESPONSES TO PHQ9 QUESTIONS 1 AND 2: 0
SUM OF ALL RESPONSES TO PHQ9 QUESTIONS 1 AND 2: 0
2. FEELING DOWN, DEPRESSED OR HOPELESS: NOT AT ALL
CLINICAL INTERPRETATION OF PHQ2 SCORE: NO FURTHER SCREENING NEEDED
1. LITTLE INTEREST OR PLEASURE IN DOING THINGS: NOT AT ALL

## 2022-05-10 LAB
ANION GAP SERPL CALC-SCNC: 10 MMOL/L (ref 10–20)
BUN SERPL-MCNC: 7 MG/DL (ref 6–20)
BUN/CREAT SERPL: 9 (ref 7–25)
CALCIUM SERPL-MCNC: 9.4 MG/DL (ref 8.4–10.2)
CHLORIDE SERPL-SCNC: 110 MMOL/L (ref 98–107)
CO2 SERPL-SCNC: 27 MMOL/L (ref 21–32)
CREAT SERPL-MCNC: 0.81 MG/DL (ref 0.51–0.95)
ESTRADIOL SERPL-MCNC: 120 PG/ML
FASTING DURATION TIME PATIENT: ABNORMAL H
FSH SERPL-ACNC: 3.2 MUNITS/ML
GFR SERPLBLD BASED ON 1.73 SQ M-ARVRAT: >90 ML/MIN
GLUCOSE SERPL-MCNC: 95 MG/DL (ref 70–99)
HCG SERPL-ACNC: <2 MUNITS/ML
LH SERPL-ACNC: 4.7 MUNITS/ML
POTASSIUM SERPL-SCNC: 4.5 MMOL/L (ref 3.4–5.1)
PROLACTIN SERPL-MCNC: 7.5 NG/ML (ref 2.8–29.2)
SODIUM SERPL-SCNC: 142 MMOL/L (ref 135–145)

## 2022-05-16 LAB — 17OHP SERPL-MCNC: 178.1 NG/DL

## 2022-05-21 LAB
ANDROST SERPL-MCNC: 0.86 NG/ML (ref 0.26–2.14)
TESTOST SERPL-MCNC: 16 NG/DL (ref 9–55)

## 2022-06-03 ENCOUNTER — OFFICE VISIT (OUTPATIENT)
Dept: CARDIOLOGY | Age: 22
End: 2022-06-03

## 2022-06-03 VITALS
BODY MASS INDEX: 45.54 KG/M2 | WEIGHT: 249 LBS | SYSTOLIC BLOOD PRESSURE: 100 MMHG | HEART RATE: 72 BPM | DIASTOLIC BLOOD PRESSURE: 66 MMHG

## 2022-06-03 DIAGNOSIS — R00.2 PALPITATIONS: ICD-10-CM

## 2022-06-03 DIAGNOSIS — E66.01 CLASS 3 SEVERE OBESITY DUE TO EXCESS CALORIES WITHOUT SERIOUS COMORBIDITY WITH BODY MASS INDEX (BMI) OF 45.0 TO 49.9 IN ADULT (CMD): ICD-10-CM

## 2022-06-03 DIAGNOSIS — I47.11 INAPPROPRIATE SINUS TACHYCARDIA: Primary | ICD-10-CM

## 2022-06-03 PROCEDURE — 99213 OFFICE O/P EST LOW 20 MIN: CPT | Performed by: INTERNAL MEDICINE

## 2022-06-28 ENCOUNTER — E-ADVICE (OUTPATIENT)
Dept: INTERNAL MEDICINE | Age: 22
End: 2022-06-28

## 2022-06-28 RX ORDER — SEMAGLUTIDE 1.7 MG/.75ML
1.7 INJECTION, SOLUTION SUBCUTANEOUS
Qty: 3 ML | Refills: 2 | Status: SHIPPED | OUTPATIENT
Start: 2022-06-28 | End: 2022-07-29 | Stop reason: SDUPTHER

## 2022-07-06 ENCOUNTER — E-ADVICE (OUTPATIENT)
Dept: INTERNAL MEDICINE | Age: 22
End: 2022-07-06

## 2022-07-07 ENCOUNTER — TELEPHONE (OUTPATIENT)
Dept: SCHEDULING | Age: 22
End: 2022-07-07

## 2022-07-08 ENCOUNTER — TELEPHONE (OUTPATIENT)
Dept: URGENT CARE | Age: 22
End: 2022-07-08

## 2022-07-08 ENCOUNTER — NURSE TRIAGE (OUTPATIENT)
Dept: SCHEDULING | Age: 22
End: 2022-07-08

## 2022-07-08 ENCOUNTER — TELEPHONE (OUTPATIENT)
Dept: SCHEDULING | Age: 22
End: 2022-07-08

## 2022-07-08 ENCOUNTER — APPOINTMENT (OUTPATIENT)
Dept: URGENT CARE | Age: 22
End: 2022-07-08

## 2022-07-29 ENCOUNTER — E-ADVICE (OUTPATIENT)
Dept: INTERNAL MEDICINE | Age: 22
End: 2022-07-29

## 2022-07-29 RX ORDER — SEMAGLUTIDE 1.7 MG/.75ML
1.7 INJECTION, SOLUTION SUBCUTANEOUS
Qty: 3 ML | Refills: 2 | Status: SHIPPED | OUTPATIENT
Start: 2022-07-29 | End: 2022-08-15 | Stop reason: DRUGHIGH

## 2022-08-15 ENCOUNTER — OFFICE VISIT (OUTPATIENT)
Dept: ENDOCRINOLOGY | Age: 22
End: 2022-08-15

## 2022-08-15 ENCOUNTER — LAB SERVICES (OUTPATIENT)
Dept: LAB | Age: 22
End: 2022-08-15

## 2022-08-15 VITALS
DIASTOLIC BLOOD PRESSURE: 84 MMHG | BODY MASS INDEX: 44.1 KG/M2 | WEIGHT: 239.64 LBS | HEIGHT: 62 IN | HEART RATE: 76 BPM | SYSTOLIC BLOOD PRESSURE: 115 MMHG

## 2022-08-15 DIAGNOSIS — T14.8XXA BRUISING: ICD-10-CM

## 2022-08-15 DIAGNOSIS — E66.01 CLASS 3 SEVERE OBESITY DUE TO EXCESS CALORIES WITHOUT SERIOUS COMORBIDITY WITH BODY MASS INDEX (BMI) OF 40.0 TO 44.9 IN ADULT (CMD): Primary | ICD-10-CM

## 2022-08-15 DIAGNOSIS — E28.2 PCOS (POLYCYSTIC OVARIAN SYNDROME): ICD-10-CM

## 2022-08-15 PROCEDURE — 99214 OFFICE O/P EST MOD 30 MIN: CPT | Performed by: INTERNAL MEDICINE

## 2022-08-15 PROCEDURE — 36415 COLL VENOUS BLD VENIPUNCTURE: CPT | Performed by: INTERNAL MEDICINE

## 2022-08-15 PROCEDURE — 83550 IRON BINDING TEST: CPT | Performed by: INTERNAL MEDICINE

## 2022-08-15 PROCEDURE — 82746 ASSAY OF FOLIC ACID SERUM: CPT | Performed by: INTERNAL MEDICINE

## 2022-08-15 PROCEDURE — 83540 ASSAY OF IRON: CPT | Performed by: INTERNAL MEDICINE

## 2022-08-15 PROCEDURE — 85025 COMPLETE CBC W/AUTO DIFF WBC: CPT | Performed by: INTERNAL MEDICINE

## 2022-08-15 PROCEDURE — 82607 VITAMIN B-12: CPT | Performed by: INTERNAL MEDICINE

## 2022-08-15 PROCEDURE — 80048 BASIC METABOLIC PNL TOTAL CA: CPT | Performed by: INTERNAL MEDICINE

## 2022-08-15 RX ORDER — SEMAGLUTIDE 2.4 MG/.75ML
2.4 INJECTION, SOLUTION SUBCUTANEOUS
Qty: 2.4 ML | Refills: 5 | Status: SHIPPED | OUTPATIENT
Start: 2022-08-15 | End: 2022-12-12 | Stop reason: DRUGHIGH

## 2022-08-15 ASSESSMENT — PATIENT HEALTH QUESTIONNAIRE - PHQ9
SUM OF ALL RESPONSES TO PHQ9 QUESTIONS 1 AND 2: 0
SUM OF ALL RESPONSES TO PHQ9 QUESTIONS 1 AND 2: 0
2. FEELING DOWN, DEPRESSED OR HOPELESS: NOT AT ALL
1. LITTLE INTEREST OR PLEASURE IN DOING THINGS: NOT AT ALL
CLINICAL INTERPRETATION OF PHQ2 SCORE: NO FURTHER SCREENING NEEDED

## 2022-08-16 LAB
ANION GAP SERPL CALC-SCNC: 12 MMOL/L (ref 7–19)
BASOPHILS # BLD: 0.1 K/MCL (ref 0–0.3)
BASOPHILS NFR BLD: 1 %
BUN SERPL-MCNC: 6 MG/DL (ref 6–20)
BUN/CREAT SERPL: 8 (ref 7–25)
CALCIUM SERPL-MCNC: 9.6 MG/DL (ref 8.4–10.2)
CHLORIDE SERPL-SCNC: 109 MMOL/L (ref 97–110)
CO2 SERPL-SCNC: 24 MMOL/L (ref 21–32)
CREAT SERPL-MCNC: 0.77 MG/DL (ref 0.51–0.95)
DEPRECATED RDW RBC: 48.8 FL (ref 39–50)
EOSINOPHIL # BLD: 0.2 K/MCL (ref 0–0.5)
EOSINOPHIL NFR BLD: 4 %
ERYTHROCYTE [DISTWIDTH] IN BLOOD: 15.7 % (ref 11–15)
FASTING DURATION TIME PATIENT: ABNORMAL H
FOLATE SERPL-MCNC: 11.8 NG/ML
GFR SERPLBLD BASED ON 1.73 SQ M-ARVRAT: >90 ML/MIN
GLUCOSE SERPL-MCNC: 102 MG/DL (ref 70–99)
HCT VFR BLD CALC: 43.8 % (ref 36–46.5)
HGB BLD-MCNC: 13.8 G/DL (ref 12–15.5)
IMM GRANULOCYTES # BLD AUTO: 0 K/MCL (ref 0–0.2)
IMM GRANULOCYTES # BLD: 0 %
IRON SATN MFR SERPL: 13 % (ref 15–45)
IRON SERPL-MCNC: 46 MCG/DL (ref 50–170)
LYMPHOCYTES # BLD: 2.5 K/MCL (ref 1–4.8)
LYMPHOCYTES NFR BLD: 38 %
MCH RBC QN AUTO: 26.6 PG (ref 26–34)
MCHC RBC AUTO-ENTMCNC: 31.5 G/DL (ref 32–36.5)
MCV RBC AUTO: 84.6 FL (ref 78–100)
MONOCYTES # BLD: 0.4 K/MCL (ref 0.3–0.9)
MONOCYTES NFR BLD: 7 %
NEUTROPHILS # BLD: 3.4 K/MCL (ref 1.8–7.7)
NEUTROPHILS NFR BLD: 50 %
NRBC BLD MANUAL-RTO: 0 /100 WBC
PLATELET # BLD AUTO: 324 K/MCL (ref 140–450)
POTASSIUM SERPL-SCNC: 4.4 MMOL/L (ref 3.4–5.1)
RBC # BLD: 5.18 MIL/MCL (ref 4–5.2)
SODIUM SERPL-SCNC: 141 MMOL/L (ref 135–145)
TIBC SERPL-MCNC: 353 MCG/DL (ref 250–450)
VIT B12 SERPL-MCNC: 564 PG/ML (ref 211–911)
WBC # BLD: 6.6 K/MCL (ref 4.2–11)

## 2022-08-22 ENCOUNTER — TELEPHONE (OUTPATIENT)
Dept: SCHEDULING | Age: 22
End: 2022-08-22

## 2022-08-22 ENCOUNTER — NURSE TRIAGE (OUTPATIENT)
Dept: TELEHEALTH | Age: 22
End: 2022-08-22

## 2022-09-15 ENCOUNTER — TELEPHONE (OUTPATIENT)
Dept: SCHEDULING | Age: 22
End: 2022-09-15

## 2022-09-16 ENCOUNTER — TELEPHONE (OUTPATIENT)
Dept: ENDOCRINOLOGY | Age: 22
End: 2022-09-16

## 2022-09-19 ENCOUNTER — E-ADVICE (OUTPATIENT)
Dept: INTERNAL MEDICINE | Age: 22
End: 2022-09-19

## 2022-09-20 ENCOUNTER — OFFICE VISIT (OUTPATIENT)
Dept: INTERNAL MEDICINE | Age: 22
End: 2022-09-20

## 2022-09-20 VITALS
HEART RATE: 73 BPM | BODY MASS INDEX: 43.74 KG/M2 | DIASTOLIC BLOOD PRESSURE: 70 MMHG | TEMPERATURE: 98.8 F | WEIGHT: 237.7 LBS | HEIGHT: 62 IN | SYSTOLIC BLOOD PRESSURE: 110 MMHG | OXYGEN SATURATION: 97 %

## 2022-09-20 DIAGNOSIS — E66.01 OBESITY, MORBID, BMI 40.0-49.9 (CMD): ICD-10-CM

## 2022-09-20 DIAGNOSIS — B37.2 CANDIDIASIS OF SKIN: Primary | ICD-10-CM

## 2022-09-20 PROCEDURE — 99213 OFFICE O/P EST LOW 20 MIN: CPT | Performed by: NURSE PRACTITIONER

## 2022-09-20 RX ORDER — BUPROPION HYDROCHLORIDE 300 MG/1
TABLET ORAL
COMMUNITY
Start: 2022-09-15 | End: 2023-04-03

## 2022-09-20 RX ORDER — NYSTATIN 100000 U/G
1 CREAM TOPICAL 2 TIMES DAILY
Qty: 30 G | Refills: 2 | Status: SHIPPED | OUTPATIENT
Start: 2022-09-20 | End: 2023-04-03

## 2022-09-20 ASSESSMENT — ENCOUNTER SYMPTOMS
LIGHT-HEADEDNESS: 0
CHILLS: 0
ABDOMINAL PAIN: 0
ADENOPATHY: 0
SHORTNESS OF BREATH: 0
DIZZINESS: 0
WHEEZING: 0
COUGH: 0
FEVER: 0
BRUISES/BLEEDS EASILY: 0

## 2022-09-20 ASSESSMENT — PAIN SCALES - GENERAL: PAINLEVEL: 0

## 2022-10-06 ENCOUNTER — OFFICE VISIT (OUTPATIENT)
Dept: INTERNAL MEDICINE | Age: 22
End: 2022-10-06

## 2022-10-06 VITALS
BODY MASS INDEX: 41.96 KG/M2 | DIASTOLIC BLOOD PRESSURE: 79 MMHG | HEIGHT: 62 IN | TEMPERATURE: 98 F | OXYGEN SATURATION: 97 % | WEIGHT: 228 LBS | SYSTOLIC BLOOD PRESSURE: 122 MMHG

## 2022-10-06 DIAGNOSIS — E66.01 OBESITY, MORBID, BMI 40.0-49.9 (CMD): Primary | ICD-10-CM

## 2022-10-06 DIAGNOSIS — I47.11 INAPPROPRIATE SINUS TACHYCARDIA: ICD-10-CM

## 2022-10-06 DIAGNOSIS — R00.2 PALPITATIONS: ICD-10-CM

## 2022-10-06 DIAGNOSIS — E28.2 PCOS (POLYCYSTIC OVARIAN SYNDROME): ICD-10-CM

## 2022-10-06 PROBLEM — K21.9 GASTROESOPHAGEAL REFLUX DISEASE WITHOUT ESOPHAGITIS: Status: RESOLVED | Noted: 2019-12-23 | Resolved: 2022-10-06

## 2022-10-06 PROBLEM — R19.7 DIARRHEA OF PRESUMED INFECTIOUS ORIGIN: Status: RESOLVED | Noted: 2020-03-06 | Resolved: 2022-10-06

## 2022-10-06 PROCEDURE — 99214 OFFICE O/P EST MOD 30 MIN: CPT | Performed by: FAMILY MEDICINE

## 2022-10-06 ASSESSMENT — PATIENT HEALTH QUESTIONNAIRE - PHQ9
1. LITTLE INTEREST OR PLEASURE IN DOING THINGS: NOT AT ALL
2. FEELING DOWN, DEPRESSED OR HOPELESS: NOT AT ALL
SUM OF ALL RESPONSES TO PHQ9 QUESTIONS 1 AND 2: 0
SUM OF ALL RESPONSES TO PHQ9 QUESTIONS 1 AND 2: 0
CLINICAL INTERPRETATION OF PHQ2 SCORE: NO FURTHER SCREENING NEEDED

## 2022-10-06 ASSESSMENT — PAIN SCALES - GENERAL: PAINLEVEL: 0

## 2022-10-24 ENCOUNTER — NURSE TRIAGE (OUTPATIENT)
Dept: TELEHEALTH | Age: 22
End: 2022-10-24

## 2022-10-24 ENCOUNTER — E-ADVICE (OUTPATIENT)
Dept: INTERNAL MEDICINE | Age: 22
End: 2022-10-24

## 2022-10-24 DIAGNOSIS — R11.0 NAUSEA: Primary | ICD-10-CM

## 2022-10-24 RX ORDER — ONDANSETRON 4 MG/1
4 TABLET, FILM COATED ORAL EVERY 8 HOURS PRN
Qty: 20 TABLET | Refills: 0 | Status: SHIPPED | OUTPATIENT
Start: 2022-10-24 | End: 2023-04-03

## 2022-11-02 ENCOUNTER — OFFICE VISIT (OUTPATIENT)
Dept: INTERNAL MEDICINE | Age: 22
End: 2022-11-02

## 2022-11-02 ENCOUNTER — LAB SERVICES (OUTPATIENT)
Dept: LAB | Age: 22
End: 2022-11-02

## 2022-11-02 VITALS
DIASTOLIC BLOOD PRESSURE: 82 MMHG | SYSTOLIC BLOOD PRESSURE: 122 MMHG | OXYGEN SATURATION: 99 % | HEIGHT: 62 IN | HEART RATE: 98 BPM | TEMPERATURE: 97.4 F | WEIGHT: 215 LBS | BODY MASS INDEX: 39.56 KG/M2

## 2022-11-02 DIAGNOSIS — R53.83 FATIGUE, UNSPECIFIED TYPE: ICD-10-CM

## 2022-11-02 DIAGNOSIS — E66.01 CLASS 3 SEVERE OBESITY DUE TO EXCESS CALORIES WITHOUT SERIOUS COMORBIDITY WITH BODY MASS INDEX (BMI) OF 45.0 TO 49.9 IN ADULT (CMD): Primary | ICD-10-CM

## 2022-11-02 LAB
25(OH)D3+25(OH)D2 SERPL-MCNC: 26.9 NG/ML (ref 30–100)
ALBUMIN SERPL-MCNC: 3.9 G/DL (ref 3.6–5.1)
ALBUMIN/GLOB SERPL: 1.1 {RATIO} (ref 1–2.4)
ALP SERPL-CCNC: 74 UNITS/L (ref 45–117)
ALT SERPL-CCNC: 24 UNITS/L
ANION GAP SERPL CALC-SCNC: 10 MMOL/L (ref 7–19)
AST SERPL-CCNC: 17 UNITS/L
BASOPHILS # BLD: 0.1 K/MCL (ref 0–0.3)
BASOPHILS NFR BLD: 1 %
BILIRUB SERPL-MCNC: 0.5 MG/DL (ref 0.2–1)
BUN SERPL-MCNC: 7 MG/DL (ref 6–20)
BUN/CREAT SERPL: 9 (ref 7–25)
CALCIUM SERPL-MCNC: 9.4 MG/DL (ref 8.4–10.2)
CHLORIDE SERPL-SCNC: 109 MMOL/L (ref 97–110)
CO2 SERPL-SCNC: 26 MMOL/L (ref 21–32)
CREAT SERPL-MCNC: 0.78 MG/DL (ref 0.51–0.95)
DEPRECATED RDW RBC: 46.1 FL (ref 39–50)
EOSINOPHIL # BLD: 0.3 K/MCL (ref 0–0.5)
EOSINOPHIL NFR BLD: 4 %
ERYTHROCYTE [DISTWIDTH] IN BLOOD: 15.2 % (ref 11–15)
FASTING DURATION TIME PATIENT: NORMAL H
FERRITIN SERPL-MCNC: 40 NG/ML (ref 8–252)
GFR SERPLBLD BASED ON 1.73 SQ M-ARVRAT: >90 ML/MIN
GLOBULIN SER-MCNC: 3.7 G/DL (ref 2–4)
GLUCOSE SERPL-MCNC: 86 MG/DL (ref 70–99)
HCT VFR BLD CALC: 44.6 % (ref 36–46.5)
HGB BLD-MCNC: 14.1 G/DL (ref 12–15.5)
IMM GRANULOCYTES # BLD AUTO: 0 K/MCL (ref 0–0.2)
IMM GRANULOCYTES # BLD: 0 %
IRON SATN MFR SERPL: 16 % (ref 15–45)
IRON SERPL-MCNC: 55 MCG/DL (ref 50–170)
LIPASE SERPL-CCNC: 137 UNITS/L (ref 73–393)
LYMPHOCYTES # BLD: 2.8 K/MCL (ref 1–4.8)
LYMPHOCYTES NFR BLD: 38 %
MCH RBC QN AUTO: 26.8 PG (ref 26–34)
MCHC RBC AUTO-ENTMCNC: 31.6 G/DL (ref 32–36.5)
MCV RBC AUTO: 84.6 FL (ref 78–100)
MONOCYTES # BLD: 0.5 K/MCL (ref 0.3–0.9)
MONOCYTES NFR BLD: 6 %
NEUTROPHILS # BLD: 3.8 K/MCL (ref 1.8–7.7)
NEUTROPHILS NFR BLD: 51 %
NRBC BLD MANUAL-RTO: 0 /100 WBC
PLATELET # BLD AUTO: 285 K/MCL (ref 140–450)
POTASSIUM SERPL-SCNC: 4.4 MMOL/L (ref 3.4–5.1)
PROT SERPL-MCNC: 7.6 G/DL (ref 6.4–8.2)
RBC # BLD: 5.27 MIL/MCL (ref 4–5.2)
SODIUM SERPL-SCNC: 141 MMOL/L (ref 135–145)
TIBC SERPL-MCNC: 337 MCG/DL (ref 250–450)
TSH SERPL-ACNC: 1.22 MCUNITS/ML (ref 0.35–5)
WBC # BLD: 7.4 K/MCL (ref 4.2–11)

## 2022-11-02 PROCEDURE — 82728 ASSAY OF FERRITIN: CPT | Performed by: INTERNAL MEDICINE

## 2022-11-02 PROCEDURE — 99213 OFFICE O/P EST LOW 20 MIN: CPT | Performed by: STUDENT IN AN ORGANIZED HEALTH CARE EDUCATION/TRAINING PROGRAM

## 2022-11-02 PROCEDURE — 83690 ASSAY OF LIPASE: CPT | Performed by: INTERNAL MEDICINE

## 2022-11-02 PROCEDURE — 83550 IRON BINDING TEST: CPT | Performed by: INTERNAL MEDICINE

## 2022-11-02 PROCEDURE — 80050 GENERAL HEALTH PANEL: CPT | Performed by: INTERNAL MEDICINE

## 2022-11-02 PROCEDURE — 36415 COLL VENOUS BLD VENIPUNCTURE: CPT | Performed by: INTERNAL MEDICINE

## 2022-11-02 PROCEDURE — 83540 ASSAY OF IRON: CPT | Performed by: INTERNAL MEDICINE

## 2022-11-02 PROCEDURE — 82306 VITAMIN D 25 HYDROXY: CPT | Performed by: INTERNAL MEDICINE

## 2022-11-02 ASSESSMENT — ENCOUNTER SYMPTOMS
NAUSEA: 0
VOMITING: 0
CHILLS: 0
FEVER: 0
COUGH: 0
SHORTNESS OF BREATH: 0

## 2022-11-02 ASSESSMENT — PATIENT HEALTH QUESTIONNAIRE - PHQ9
CLINICAL INTERPRETATION OF PHQ2 SCORE: NO FURTHER SCREENING NEEDED
1. LITTLE INTEREST OR PLEASURE IN DOING THINGS: NOT AT ALL
2. FEELING DOWN, DEPRESSED OR HOPELESS: NOT AT ALL
SUM OF ALL RESPONSES TO PHQ9 QUESTIONS 1 AND 2: 0
SUM OF ALL RESPONSES TO PHQ9 QUESTIONS 1 AND 2: 0

## 2022-11-02 ASSESSMENT — PAIN SCALES - GENERAL: PAINLEVEL: 0

## 2022-11-03 ENCOUNTER — TELEPHONE (OUTPATIENT)
Dept: INTERNAL MEDICINE | Age: 22
End: 2022-11-03

## 2022-11-23 ENCOUNTER — APPOINTMENT (OUTPATIENT)
Dept: MRI IMAGING | Age: 22
End: 2022-11-23
Attending: FAMILY MEDICINE

## 2022-11-23 ENCOUNTER — APPOINTMENT (OUTPATIENT)
Dept: GENERAL RADIOLOGY | Age: 22
End: 2022-11-23
Attending: FAMILY MEDICINE

## 2022-11-29 ENCOUNTER — TELEPHONE (OUTPATIENT)
Dept: SCHEDULING | Age: 22
End: 2022-11-29

## 2022-11-29 ENCOUNTER — TELEPHONE (OUTPATIENT)
Dept: INTERNAL MEDICINE | Age: 22
End: 2022-11-29

## 2022-12-09 ENCOUNTER — TELEPHONE (OUTPATIENT)
Dept: ORTHOPEDICS CLINIC | Facility: CLINIC | Age: 22
End: 2022-12-09

## 2022-12-09 DIAGNOSIS — M25.552 LEFT HIP PAIN: Primary | ICD-10-CM

## 2022-12-09 NOTE — TELEPHONE ENCOUNTER
NP Left Hip Issues and Pain  Patient will bring MRI. Please advise if additional imaging is needed.   Future Appointments   Date Time Provider Estefania Fany   1/18/2023 10:00 AM Khushi Guerrero MD [facility] Pending sale to [facility]

## 2022-12-12 ENCOUNTER — OFFICE VISIT (OUTPATIENT)
Dept: ENDOCRINOLOGY | Age: 22
End: 2022-12-12

## 2022-12-12 VITALS
SYSTOLIC BLOOD PRESSURE: 101 MMHG | HEIGHT: 62 IN | WEIGHT: 204.37 LBS | DIASTOLIC BLOOD PRESSURE: 67 MMHG | HEART RATE: 93 BPM | BODY MASS INDEX: 37.61 KG/M2

## 2022-12-12 DIAGNOSIS — E66.09 CLASS 2 OBESITY DUE TO EXCESS CALORIES WITH BODY MASS INDEX (BMI) OF 37.0 TO 37.9 IN ADULT, UNSPECIFIED WHETHER SERIOUS COMORBIDITY PRESENT: Primary | ICD-10-CM

## 2022-12-12 PROCEDURE — 99214 OFFICE O/P EST MOD 30 MIN: CPT | Performed by: INTERNAL MEDICINE

## 2022-12-12 ASSESSMENT — PATIENT HEALTH QUESTIONNAIRE - PHQ9
1. LITTLE INTEREST OR PLEASURE IN DOING THINGS: NOT AT ALL
SUM OF ALL RESPONSES TO PHQ9 QUESTIONS 1 AND 2: 0
SUM OF ALL RESPONSES TO PHQ9 QUESTIONS 1 AND 2: 0
CLINICAL INTERPRETATION OF PHQ2 SCORE: NO FURTHER SCREENING NEEDED
2. FEELING DOWN, DEPRESSED OR HOPELESS: NOT AT ALL

## 2023-04-03 ENCOUNTER — OFFICE VISIT (OUTPATIENT)
Dept: ENDOCRINOLOGY | Age: 23
End: 2023-04-03

## 2023-04-03 VITALS
WEIGHT: 199.08 LBS | DIASTOLIC BLOOD PRESSURE: 76 MMHG | SYSTOLIC BLOOD PRESSURE: 116 MMHG | HEART RATE: 68 BPM | HEIGHT: 62 IN | BODY MASS INDEX: 36.63 KG/M2

## 2023-04-03 DIAGNOSIS — E28.2 PCOS (POLYCYSTIC OVARIAN SYNDROME): ICD-10-CM

## 2023-04-03 DIAGNOSIS — E66.09 CLASS 2 OBESITY DUE TO EXCESS CALORIES WITHOUT SERIOUS COMORBIDITY WITH BODY MASS INDEX (BMI) OF 36.0 TO 36.9 IN ADULT: Primary | ICD-10-CM

## 2023-04-03 PROCEDURE — 99214 OFFICE O/P EST MOD 30 MIN: CPT | Performed by: INTERNAL MEDICINE

## 2023-04-03 ASSESSMENT — PATIENT HEALTH QUESTIONNAIRE - PHQ9
2. FEELING DOWN, DEPRESSED OR HOPELESS: NOT AT ALL
SUM OF ALL RESPONSES TO PHQ9 QUESTIONS 1 AND 2: 0
SUM OF ALL RESPONSES TO PHQ9 QUESTIONS 1 AND 2: 0
1. LITTLE INTEREST OR PLEASURE IN DOING THINGS: NOT AT ALL
CLINICAL INTERPRETATION OF PHQ2 SCORE: NO FURTHER SCREENING NEEDED

## 2023-04-06 ENCOUNTER — OFFICE VISIT (OUTPATIENT)
Dept: INTERNAL MEDICINE | Age: 23
End: 2023-04-06

## 2023-04-06 VITALS
WEIGHT: 198.9 LBS | OXYGEN SATURATION: 100 % | HEIGHT: 62 IN | HEART RATE: 73 BPM | DIASTOLIC BLOOD PRESSURE: 74 MMHG | SYSTOLIC BLOOD PRESSURE: 106 MMHG | BODY MASS INDEX: 36.6 KG/M2

## 2023-04-06 DIAGNOSIS — E66.9 OBESITY (BMI 30-39.9): Primary | ICD-10-CM

## 2023-04-06 PROCEDURE — 99214 OFFICE O/P EST MOD 30 MIN: CPT | Performed by: FAMILY MEDICINE

## 2023-04-06 RX ORDER — BUPROPION HYDROCHLORIDE 150 MG/1
300 TABLET ORAL DAILY
Qty: 180 TABLET | Refills: 1 | Status: SHIPPED | OUTPATIENT
Start: 2023-04-06

## 2023-04-06 ASSESSMENT — PATIENT HEALTH QUESTIONNAIRE - PHQ9
CLINICAL INTERPRETATION OF PHQ2 SCORE: NO FURTHER SCREENING NEEDED
2. FEELING DOWN, DEPRESSED OR HOPELESS: NOT AT ALL
1. LITTLE INTEREST OR PLEASURE IN DOING THINGS: NOT AT ALL
SUM OF ALL RESPONSES TO PHQ9 QUESTIONS 1 AND 2: 0
SUM OF ALL RESPONSES TO PHQ9 QUESTIONS 1 AND 2: 0

## 2023-04-11 ENCOUNTER — TELEPHONE (OUTPATIENT)
Dept: CARDIOLOGY | Age: 23
End: 2023-04-11

## 2023-04-11 DIAGNOSIS — I47.11 INAPPROPRIATE SINUS TACHYCARDIA: ICD-10-CM

## 2023-04-11 DIAGNOSIS — R00.2 PALPITATIONS: Primary | ICD-10-CM

## 2023-04-25 ENCOUNTER — ANCILLARY PROCEDURE (OUTPATIENT)
Dept: CARDIOLOGY | Age: 23
End: 2023-04-25
Attending: INTERNAL MEDICINE

## 2023-04-25 DIAGNOSIS — R00.2 PALPITATIONS: ICD-10-CM

## 2023-04-25 DIAGNOSIS — I47.11 INAPPROPRIATE SINUS TACHYCARDIA: ICD-10-CM

## 2023-04-25 LAB
AORTIC VALVE AREA (AVA): 0.76
AORTIC VALVE AREA: 2.83
ASCENDING AORTA (AAD): 5
AV MEAN GRADIENT (AVMG): 3
AV MEAN VELOCITY (AVMV): 0.78
AV PEAK GRADIENT (AVPG): 5
AV PEAK VELOCITY (AVPV): 1.08
AV STENOSIS SEVERITY TEXT: NORMAL
AVI LVOT PEAK GRADIENT (LVOTMG): 0.8
E WAVE DECELARATION TIME (MDT): 6.76
INTERVENTRICULAR SEPTUM IN END DIASTOLE (IVSD): 1.71
LEFT INTERNAL DIMENSION IN SYSTOLE (LVSD): 0.9
LEFT VENTRICULAR INTERNAL DIMENSION IN DIASTOLE (LVDD): 2.9
LEFT VENTRICULAR POSTERIOR WALL IN END DIASTOLE (LVPW): 4.4
LV EF: NORMAL %
LVOT 2D (LVOTD): 20.9
LVOT VTI (LVOTVTI): 0.96
MV E TISSUE VEL LAT (MELV): 1.05
MV E TISSUE VEL MED (MESV): 17.9
MV E WAVE VEL/E TISSUE VEL MED(MSR): 11.2
MV PEAK A VELOCITY (MVPAV): 222
MV PEAK E VELOCITY (MVPEV): 0.57
RV END SYSTOLIC LONGITUDINAL STRAIN FREE WALL (RVGS): 2
TRICUSPID VALVE ANNULAR PEAK VELOCITY (TVAPV): 23
TRICUSPID VALVE PEAK REGURGITATION VELOCITY (TRPV): 2.7
TV ESTIMATED RIGHT ARTERIAL PRESSURE (RAP): 13.7

## 2023-04-25 PROCEDURE — 93306 TTE W/DOPPLER COMPLETE: CPT | Performed by: INTERNAL MEDICINE

## 2023-05-01 ENCOUNTER — OFFICE VISIT (OUTPATIENT)
Dept: CARDIOLOGY | Age: 23
End: 2023-05-01

## 2023-05-01 VITALS
DIASTOLIC BLOOD PRESSURE: 70 MMHG | HEART RATE: 60 BPM | SYSTOLIC BLOOD PRESSURE: 118 MMHG | WEIGHT: 194 LBS | BODY MASS INDEX: 35.48 KG/M2

## 2023-05-01 DIAGNOSIS — R00.2 PALPITATIONS: Primary | ICD-10-CM

## 2023-05-01 DIAGNOSIS — I47.11 INAPPROPRIATE SINUS TACHYCARDIA: ICD-10-CM

## 2023-05-01 DIAGNOSIS — E66.01 MORBID OBESITY (CMD): ICD-10-CM

## 2023-05-01 PROCEDURE — 99214 OFFICE O/P EST MOD 30 MIN: CPT | Performed by: INTERNAL MEDICINE

## 2023-05-21 ENCOUNTER — E-ADVICE (OUTPATIENT)
Dept: INTERNAL MEDICINE | Age: 23
End: 2023-05-21

## 2023-05-21 DIAGNOSIS — E66.9 OBESITY (BMI 30-39.9): Primary | ICD-10-CM

## 2023-05-21 DIAGNOSIS — L98.7 EXCESS SKIN: ICD-10-CM

## 2023-07-12 ENCOUNTER — APPOINTMENT (OUTPATIENT)
Dept: INTERNAL MEDICINE | Age: 23
End: 2023-07-12

## 2023-08-09 ENCOUNTER — E-ADVICE (OUTPATIENT)
Dept: ENDOCRINOLOGY | Age: 23
End: 2023-08-09

## 2023-08-10 RX ORDER — SEMAGLUTIDE 2.4 MG/.75ML
2.4 INJECTION, SOLUTION SUBCUTANEOUS
Qty: 3 ML | Refills: 3 | Status: SHIPPED | OUTPATIENT
Start: 2023-08-10 | End: 2023-10-11 | Stop reason: SDUPTHER

## 2023-08-24 ENCOUNTER — HOSPITAL ENCOUNTER (EMERGENCY)
Age: 23
Discharge: HOME | End: 2023-08-24
Attending: EMERGENCY MEDICINE

## 2023-08-24 ENCOUNTER — TELEPHONE (OUTPATIENT)
Dept: INTERNAL MEDICINE | Age: 23
End: 2023-08-24

## 2023-08-24 ENCOUNTER — NURSE TRIAGE (OUTPATIENT)
Dept: TELEHEALTH | Age: 23
End: 2023-08-24

## 2023-08-24 VITALS
HEART RATE: 87 BPM | OXYGEN SATURATION: 95 % | SYSTOLIC BLOOD PRESSURE: 113 MMHG | DIASTOLIC BLOOD PRESSURE: 78 MMHG | RESPIRATION RATE: 16 BRPM | TEMPERATURE: 99.8 F

## 2023-08-24 DIAGNOSIS — G57.12 MERALGIA PARAESTHETICA, LEFT: Primary | ICD-10-CM

## 2023-08-24 PROCEDURE — 99283 EMERGENCY DEPT VISIT LOW MDM: CPT

## 2023-08-24 PROCEDURE — 99283 EMERGENCY DEPT VISIT LOW MDM: CPT | Performed by: EMERGENCY MEDICINE

## 2023-08-24 PROCEDURE — 10002803 HB RX 637

## 2023-08-24 RX ORDER — LIDOCAINE 4 G/G
1 PATCH TOPICAL EVERY 24 HOURS
Qty: 12 PATCH | Refills: 0 | Status: SHIPPED | OUTPATIENT
Start: 2023-08-24 | End: 2023-08-28 | Stop reason: DRUGHIGH

## 2023-08-24 RX ORDER — IBUPROFEN 600 MG/1
600 TABLET ORAL ONCE
Status: COMPLETED | OUTPATIENT
Start: 2023-08-24 | End: 2023-08-24

## 2023-08-24 RX ORDER — LIDOCAINE 4 G/G
1 PATCH TOPICAL ONCE
Status: DISCONTINUED | OUTPATIENT
Start: 2023-08-24 | End: 2023-08-24 | Stop reason: HOSPADM

## 2023-08-24 RX ADMIN — IBUPROFEN 600 MG: 600 TABLET ORAL at 08:29

## 2023-08-24 RX ADMIN — LIDOCAINE 1 PATCH: 4 PATCH TOPICAL at 08:30

## 2023-08-24 ASSESSMENT — PAIN SCALES - GENERAL: PAINLEVEL_OUTOF10: 3

## 2023-08-28 ENCOUNTER — OFFICE VISIT (OUTPATIENT)
Dept: INTERNAL MEDICINE | Age: 23
End: 2023-08-28

## 2023-08-28 ENCOUNTER — E-ADVICE (OUTPATIENT)
Dept: INTERNAL MEDICINE | Age: 23
End: 2023-08-28

## 2023-08-28 VITALS
OXYGEN SATURATION: 99 % | HEIGHT: 62 IN | HEART RATE: 84 BPM | SYSTOLIC BLOOD PRESSURE: 111 MMHG | DIASTOLIC BLOOD PRESSURE: 77 MMHG | BODY MASS INDEX: 35.7 KG/M2 | WEIGHT: 194 LBS

## 2023-08-28 DIAGNOSIS — G57.12 MERALGIA PARESTHETICA OF LEFT SIDE: ICD-10-CM

## 2023-08-28 DIAGNOSIS — Z98.890 S/P PANNICULECTOMY: Primary | ICD-10-CM

## 2023-08-28 PROCEDURE — 99214 OFFICE O/P EST MOD 30 MIN: CPT | Performed by: FAMILY MEDICINE

## 2023-08-28 PROCEDURE — 96372 THER/PROPH/DIAG INJ SC/IM: CPT | Performed by: FAMILY MEDICINE

## 2023-08-28 RX ORDER — GABAPENTIN 300 MG/1
300 CAPSULE ORAL 3 TIMES DAILY
Status: SHIPPED | COMMUNITY
Start: 2023-08-28

## 2023-08-28 RX ORDER — CYANOCOBALAMIN 1000 UG/ML
1000 INJECTION, SOLUTION INTRAMUSCULAR; SUBCUTANEOUS ONCE
Status: COMPLETED | OUTPATIENT
Start: 2023-08-28 | End: 2023-08-28

## 2023-08-28 RX ORDER — LIDOCAINE 50 MG/G
1 PATCH TOPICAL EVERY 24 HOURS
Qty: 15 PATCH | Refills: 3 | Status: SHIPPED | OUTPATIENT
Start: 2023-08-28

## 2023-08-28 RX ADMIN — CYANOCOBALAMIN 1000 MCG: 1000 INJECTION, SOLUTION INTRAMUSCULAR; SUBCUTANEOUS at 10:56

## 2023-08-28 ASSESSMENT — PATIENT HEALTH QUESTIONNAIRE - PHQ9
2. FEELING DOWN, DEPRESSED OR HOPELESS: NOT AT ALL
CLINICAL INTERPRETATION OF PHQ2 SCORE: NO FURTHER SCREENING NEEDED
SUM OF ALL RESPONSES TO PHQ9 QUESTIONS 1 AND 2: 0
SUM OF ALL RESPONSES TO PHQ9 QUESTIONS 1 AND 2: 0
1. LITTLE INTEREST OR PLEASURE IN DOING THINGS: NOT AT ALL

## 2023-10-11 RX ORDER — SEMAGLUTIDE 2.4 MG/.75ML
2.4 INJECTION, SOLUTION SUBCUTANEOUS
Qty: 3 ML | Refills: 3 | Status: SHIPPED | OUTPATIENT
Start: 2023-10-11 | End: 2024-01-31

## 2023-10-23 ENCOUNTER — EXTERNAL LAB (OUTPATIENT)
Dept: OTHER | Age: 23
End: 2023-10-23

## 2023-10-23 LAB — LAB RESULT: NORMAL

## 2024-01-29 ENCOUNTER — EXTERNAL LAB (OUTPATIENT)
Dept: OTHER | Age: 24
End: 2024-01-29

## 2024-01-29 LAB
HCG SERPL-ACNC: 14.76 MIU/ML (ref 0–5)
PROGEST SERPL-MCNC: 15.4 NG/ML

## 2024-02-02 ENCOUNTER — E-ADVICE (OUTPATIENT)
Dept: INTERNAL MEDICINE | Age: 24
End: 2024-02-02

## 2024-02-13 ENCOUNTER — EXTERNAL LAB (OUTPATIENT)
Dept: OTHER | Age: 24
End: 2024-02-13

## 2024-02-13 LAB — LAB RESULT: NORMAL

## 2024-04-15 ENCOUNTER — HOSPITAL ENCOUNTER (EMERGENCY)
Age: 24
Discharge: LEFT BEFORE DISCHARGE | End: 2024-04-16

## 2024-04-15 VITALS
SYSTOLIC BLOOD PRESSURE: 126 MMHG | TEMPERATURE: 98.4 F | OXYGEN SATURATION: 98 % | HEART RATE: 88 BPM | DIASTOLIC BLOOD PRESSURE: 88 MMHG | RESPIRATION RATE: 18 BRPM

## 2024-04-15 LAB
ALBUMIN SERPL-MCNC: 2.8 G/DL (ref 3.6–5.1)
ALBUMIN/GLOB SERPL: 0.7 {RATIO} (ref 1–2.4)
ALP SERPL-CCNC: 59 UNITS/L (ref 45–117)
ALT SERPL-CCNC: 12 UNITS/L
ANION GAP SERPL CALC-SCNC: 9 MMOL/L (ref 7–19)
AST SERPL-CCNC: 13 UNITS/L
BASOPHILS # BLD: 0 K/MCL (ref 0–0.3)
BASOPHILS NFR BLD: 0 %
BILIRUB SERPL-MCNC: 0.2 MG/DL (ref 0.2–1)
BUN SERPL-MCNC: 7 MG/DL (ref 6–20)
BUN/CREAT SERPL: 13 (ref 7–25)
CALCIUM SERPL-MCNC: 8.8 MG/DL (ref 8.4–10.2)
CHLORIDE SERPL-SCNC: 109 MMOL/L (ref 97–110)
CO2 SERPL-SCNC: 23 MMOL/L (ref 21–32)
CREAT SERPL-MCNC: 0.56 MG/DL (ref 0.51–0.95)
DEPRECATED RDW RBC: 45.2 FL (ref 39–50)
EGFRCR SERPLBLD CKD-EPI 2021: >90 ML/MIN/{1.73_M2}
EOSINOPHIL # BLD: 0.2 K/MCL (ref 0–0.5)
EOSINOPHIL NFR BLD: 2 %
ERYTHROCYTE [DISTWIDTH] IN BLOOD: 14.8 % (ref 11–15)
FASTING DURATION TIME PATIENT: ABNORMAL H
GLOBULIN SER-MCNC: 4.1 G/DL (ref 2–4)
GLUCOSE SERPL-MCNC: 87 MG/DL (ref 70–99)
HCT VFR BLD CALC: 38.1 % (ref 36–46.5)
HGB BLD-MCNC: 12.5 G/DL (ref 12–15.5)
IMM GRANULOCYTES # BLD AUTO: 0 K/MCL (ref 0–0.2)
IMM GRANULOCYTES # BLD: 0 %
LYMPHOCYTES # BLD: 2.4 K/MCL (ref 1–4.8)
LYMPHOCYTES NFR BLD: 25 %
MCH RBC QN AUTO: 27.8 PG (ref 26–34)
MCHC RBC AUTO-ENTMCNC: 32.8 G/DL (ref 32–36.5)
MCV RBC AUTO: 84.7 FL (ref 78–100)
MONOCYTES # BLD: 0.5 K/MCL (ref 0.3–0.9)
MONOCYTES NFR BLD: 5 %
NEUTROPHILS # BLD: 6.5 K/MCL (ref 1.8–7.7)
NEUTROPHILS NFR BLD: 68 %
NRBC BLD MANUAL-RTO: 0 /100 WBC
PLATELET # BLD AUTO: 246 K/MCL (ref 140–450)
POTASSIUM SERPL-SCNC: 3.8 MMOL/L (ref 3.4–5.1)
PROT SERPL-MCNC: 6.9 G/DL (ref 6.4–8.2)
RBC # BLD: 4.5 MIL/MCL (ref 4–5.2)
SODIUM SERPL-SCNC: 137 MMOL/L (ref 135–145)
TROPONIN I SERPL DL<=0.01 NG/ML-MCNC: <4 NG/L
WBC # BLD: 9.7 K/MCL (ref 4.2–11)

## 2024-04-15 PROCEDURE — 84484 ASSAY OF TROPONIN QUANT: CPT | Performed by: EMERGENCY MEDICINE

## 2024-04-15 PROCEDURE — 93005 ELECTROCARDIOGRAM TRACING: CPT | Performed by: EMERGENCY MEDICINE

## 2024-04-15 PROCEDURE — 99281 EMR DPT VST MAYX REQ PHY/QHP: CPT

## 2024-04-15 PROCEDURE — 36415 COLL VENOUS BLD VENIPUNCTURE: CPT

## 2024-04-15 PROCEDURE — 80053 COMPREHEN METABOLIC PANEL: CPT | Performed by: EMERGENCY MEDICINE

## 2024-04-15 PROCEDURE — 85025 COMPLETE CBC W/AUTO DIFF WBC: CPT | Performed by: EMERGENCY MEDICINE

## 2024-04-16 ENCOUNTER — TELEPHONE (OUTPATIENT)
Dept: CARDIOLOGY | Age: 24
End: 2024-04-16

## 2024-04-16 LAB
ATRIAL RATE (BPM): 89
P AXIS (DEGREES): 37
PR-INTERVAL (MSEC): 142
QRS-INTERVAL (MSEC): 78
QT-INTERVAL (MSEC): 350
QTC: 426
R AXIS (DEGREES): 29
RAINBOW EXTRA TUBES HOLD SPECIMEN: NORMAL
REPORT TEXT: NORMAL
T AXIS (DEGREES): 39
VENTRICULAR RATE EKG/MIN (BPM): 89

## 2024-04-17 ENCOUNTER — OFFICE VISIT (OUTPATIENT)
Dept: CARDIOLOGY | Age: 24
End: 2024-04-17

## 2024-04-17 VITALS
WEIGHT: 209 LBS | DIASTOLIC BLOOD PRESSURE: 70 MMHG | BODY MASS INDEX: 38.46 KG/M2 | HEIGHT: 62 IN | SYSTOLIC BLOOD PRESSURE: 108 MMHG | OXYGEN SATURATION: 100 % | HEART RATE: 75 BPM

## 2024-04-17 DIAGNOSIS — Z3A.15 PREGNANCY WITH 15 COMPLETED WEEKS GESTATION: ICD-10-CM

## 2024-04-17 DIAGNOSIS — R00.2 PALPITATIONS: ICD-10-CM

## 2024-04-17 DIAGNOSIS — I47.11 INAPPROPRIATE SINUS TACHYCARDIA: Primary | ICD-10-CM

## 2024-04-17 DIAGNOSIS — E66.09 CLASS 2 OBESITY DUE TO EXCESS CALORIES WITHOUT SERIOUS COMORBIDITY WITH BODY MASS INDEX (BMI) OF 38.0 TO 38.9 IN ADULT: ICD-10-CM

## 2024-04-17 PROBLEM — E66.812 CLASS 2 OBESITY DUE TO EXCESS CALORIES WITHOUT SERIOUS COMORBIDITY WITH BODY MASS INDEX (BMI) OF 38.0 TO 38.9 IN ADULT: Status: ACTIVE | Noted: 2021-04-20

## 2024-04-17 PROCEDURE — G2211 COMPLEX E/M VISIT ADD ON: HCPCS | Performed by: INTERNAL MEDICINE

## 2024-04-17 PROCEDURE — 99214 OFFICE O/P EST MOD 30 MIN: CPT | Performed by: INTERNAL MEDICINE

## 2024-04-19 ENCOUNTER — E-ADVICE (OUTPATIENT)
Dept: INTERNAL MEDICINE | Age: 24
End: 2024-04-19

## 2024-04-24 ENCOUNTER — APPOINTMENT (OUTPATIENT)
Dept: CARDIOLOGY | Age: 24
End: 2024-04-24

## 2024-04-25 ENCOUNTER — APPOINTMENT (OUTPATIENT)
Dept: CARDIOLOGY | Age: 24
End: 2024-04-25
Attending: INTERNAL MEDICINE

## 2024-04-29 ENCOUNTER — TELEPHONE (OUTPATIENT)
Dept: CARDIOLOGY | Age: 24
End: 2024-04-29

## 2024-05-06 ENCOUNTER — APPOINTMENT (OUTPATIENT)
Dept: CARDIOLOGY | Age: 24
End: 2024-05-06

## 2024-05-06 ENCOUNTER — TELEPHONE (OUTPATIENT)
Dept: CARDIOLOGY | Age: 24
End: 2024-05-06

## 2024-05-06 VITALS
SYSTOLIC BLOOD PRESSURE: 120 MMHG | DIASTOLIC BLOOD PRESSURE: 70 MMHG | WEIGHT: 215.94 LBS | HEART RATE: 102 BPM | BODY MASS INDEX: 39.5 KG/M2

## 2024-05-06 DIAGNOSIS — R00.2 PALPITATIONS: Primary | ICD-10-CM

## 2024-05-06 DIAGNOSIS — I47.11 INAPPROPRIATE SINUS TACHYCARDIA (CMD): ICD-10-CM

## 2024-05-06 PROCEDURE — 99214 OFFICE O/P EST MOD 30 MIN: CPT | Performed by: NURSE PRACTITIONER

## 2024-05-06 RX ORDER — METOPROLOL SUCCINATE 25 MG/1
25 TABLET, EXTENDED RELEASE ORAL DAILY
Qty: 30 TABLET | Refills: 0 | Status: SHIPPED | OUTPATIENT
Start: 2024-05-06

## 2024-05-06 ASSESSMENT — ENCOUNTER SYMPTOMS
DIZZINESS: 1
DECREASED APPETITE: 0
SHORTNESS OF BREATH: 1
EYES NEGATIVE: 1
FEVER: 0
ORTHOPNEA: 0
LIGHT-HEADEDNESS: 1
ENDOCRINE NEGATIVE: 1
GASTROINTESTINAL NEGATIVE: 1
PSYCHIATRIC NEGATIVE: 1
COUGH: 0
CHILLS: 0

## 2024-05-07 ENCOUNTER — EXTERNAL LAB (OUTPATIENT)
Dept: HEALTH INFORMATION MANAGEMENT | Facility: OTHER | Age: 24
End: 2024-05-07

## 2024-05-07 LAB — LAB RESULT: NORMAL

## 2024-05-15 ENCOUNTER — APPOINTMENT (OUTPATIENT)
Dept: CARDIOLOGY | Age: 24
End: 2024-05-15

## 2024-05-22 ENCOUNTER — TELEPHONE (OUTPATIENT)
Dept: CARDIOLOGY | Age: 24
End: 2024-05-22

## 2024-05-24 ENCOUNTER — E-ADVICE (OUTPATIENT)
Dept: CARDIOLOGY | Age: 24
End: 2024-05-24

## 2024-05-30 RX ORDER — METOPROLOL SUCCINATE 25 MG/1
25 TABLET, EXTENDED RELEASE ORAL DAILY
Qty: 30 TABLET | Refills: 0 | Status: SHIPPED | OUTPATIENT
Start: 2024-05-30

## 2024-06-26 ENCOUNTER — OFFICE VISIT (OUTPATIENT)
Dept: INTERNAL MEDICINE | Age: 24
End: 2024-06-26

## 2024-06-26 VITALS
HEIGHT: 62 IN | OXYGEN SATURATION: 100 % | SYSTOLIC BLOOD PRESSURE: 113 MMHG | WEIGHT: 228 LBS | DIASTOLIC BLOOD PRESSURE: 64 MMHG | BODY MASS INDEX: 41.96 KG/M2 | HEART RATE: 70 BPM

## 2024-06-26 DIAGNOSIS — R20.0 NUMBNESS OF HAND: Primary | ICD-10-CM

## 2024-06-26 PROCEDURE — 99213 OFFICE O/P EST LOW 20 MIN: CPT | Performed by: FAMILY MEDICINE

## 2024-06-26 RX ORDER — ASPIRIN 81 MG/1
81 TABLET, CHEWABLE ORAL DAILY
COMMUNITY

## 2024-06-26 ASSESSMENT — ENCOUNTER SYMPTOMS
CONSTITUTIONAL NEGATIVE: 1
RESPIRATORY NEGATIVE: 1

## 2024-06-26 ASSESSMENT — PATIENT HEALTH QUESTIONNAIRE - PHQ9
SUM OF ALL RESPONSES TO PHQ9 QUESTIONS 1 AND 2: 0
SUM OF ALL RESPONSES TO PHQ9 QUESTIONS 1 AND 2: 0
CLINICAL INTERPRETATION OF PHQ2 SCORE: NO FURTHER SCREENING NEEDED
1. LITTLE INTEREST OR PLEASURE IN DOING THINGS: NOT AT ALL
2. FEELING DOWN, DEPRESSED OR HOPELESS: NOT AT ALL

## 2024-06-28 RX ORDER — METOPROLOL SUCCINATE 25 MG/1
25 TABLET, EXTENDED RELEASE ORAL DAILY
Qty: 30 TABLET | Refills: 11 | Status: SHIPPED | OUTPATIENT
Start: 2024-06-28

## 2024-07-02 ENCOUNTER — E-ADVICE (OUTPATIENT)
Dept: CARDIOLOGY | Age: 24
End: 2024-07-02

## 2024-07-03 RX ORDER — METOPROLOL SUCCINATE 25 MG/1
25 TABLET, EXTENDED RELEASE ORAL 2 TIMES DAILY
Qty: 60 TABLET | Refills: 11 | Status: SHIPPED | OUTPATIENT
Start: 2024-07-03

## 2024-07-15 ENCOUNTER — EXTERNAL LAB (OUTPATIENT)
Dept: HEALTH INFORMATION MANAGEMENT | Facility: OTHER | Age: 24
End: 2024-07-15

## 2024-07-15 LAB
LAB RESULT: NORMAL

## 2024-07-26 ENCOUNTER — APPOINTMENT (OUTPATIENT)
Dept: CARDIOLOGY | Age: 24
End: 2024-07-26

## 2024-07-26 VITALS
SYSTOLIC BLOOD PRESSURE: 128 MMHG | HEART RATE: 68 BPM | WEIGHT: 233.58 LBS | DIASTOLIC BLOOD PRESSURE: 70 MMHG | BODY MASS INDEX: 42.72 KG/M2

## 2024-07-26 DIAGNOSIS — O13.3 GESTATIONAL HYPERTENSION, THIRD TRIMESTER (CMD): ICD-10-CM

## 2024-07-26 DIAGNOSIS — R00.2 PALPITATIONS: Primary | ICD-10-CM

## 2024-07-26 DIAGNOSIS — I47.11 INAPPROPRIATE SINUS TACHYCARDIA (CMD): ICD-10-CM

## 2024-07-26 RX ORDER — LABETALOL 100 MG/1
100 TABLET, FILM COATED ORAL 2 TIMES DAILY
Qty: 60 TABLET | Refills: 3 | Status: SHIPPED | OUTPATIENT
Start: 2024-07-26

## 2024-08-05 RX ORDER — METOPROLOL SUCCINATE 25 MG/1
25 TABLET, EXTENDED RELEASE ORAL 2 TIMES DAILY
Qty: 60 TABLET | Refills: 1 | Status: SHIPPED | OUTPATIENT
Start: 2024-08-05

## 2024-09-06 ENCOUNTER — APPOINTMENT (OUTPATIENT)
Dept: CARDIOLOGY | Age: 24
End: 2024-09-06

## 2024-09-06 VITALS
DIASTOLIC BLOOD PRESSURE: 80 MMHG | WEIGHT: 240.3 LBS | SYSTOLIC BLOOD PRESSURE: 132 MMHG | HEART RATE: 96 BPM | BODY MASS INDEX: 43.95 KG/M2

## 2024-09-06 DIAGNOSIS — O13.9 GESTATIONAL HYPERTENSION, ANTEPARTUM (CMD): ICD-10-CM

## 2024-09-06 DIAGNOSIS — R00.2 PALPITATIONS: Primary | ICD-10-CM

## 2024-09-06 DIAGNOSIS — I47.11 INAPPROPRIATE SINUS TACHYCARDIA (CMD): ICD-10-CM

## 2024-09-06 ASSESSMENT — ENCOUNTER SYMPTOMS
SHORTNESS OF BREATH: 0
COUGH: 0
DIZZINESS: 0
LIGHT-HEADEDNESS: 0
FEVER: 0
GASTROINTESTINAL NEGATIVE: 1
ENDOCRINE NEGATIVE: 1
DECREASED APPETITE: 0
EYES NEGATIVE: 1
ORTHOPNEA: 0
CHILLS: 0
PSYCHIATRIC NEGATIVE: 1

## 2024-10-18 ENCOUNTER — APPOINTMENT (OUTPATIENT)
Dept: CARDIOLOGY | Age: 24
End: 2024-10-18

## 2024-10-18 VITALS
SYSTOLIC BLOOD PRESSURE: 134 MMHG | DIASTOLIC BLOOD PRESSURE: 80 MMHG | HEART RATE: 62 BPM | HEIGHT: 62 IN | OXYGEN SATURATION: 98 % | BODY MASS INDEX: 41.91 KG/M2 | WEIGHT: 227.74 LBS

## 2024-10-18 DIAGNOSIS — I47.11 INAPPROPRIATE SINUS TACHYCARDIA (CMD): Primary | ICD-10-CM

## 2024-10-18 DIAGNOSIS — E66.09 CLASS 2 OBESITY DUE TO EXCESS CALORIES WITHOUT SERIOUS COMORBIDITY WITH BODY MASS INDEX (BMI) OF 38.0 TO 38.9 IN ADULT: ICD-10-CM

## 2024-10-18 DIAGNOSIS — E66.812 CLASS 2 OBESITY DUE TO EXCESS CALORIES WITHOUT SERIOUS COMORBIDITY WITH BODY MASS INDEX (BMI) OF 38.0 TO 38.9 IN ADULT: ICD-10-CM

## 2024-10-18 ASSESSMENT — PATIENT HEALTH QUESTIONNAIRE - PHQ9
1. LITTLE INTEREST OR PLEASURE IN DOING THINGS: NOT AT ALL
SUM OF ALL RESPONSES TO PHQ9 QUESTIONS 1 AND 2: 0
2. FEELING DOWN, DEPRESSED OR HOPELESS: NOT AT ALL
SUM OF ALL RESPONSES TO PHQ9 QUESTIONS 1 AND 2: 0
CLINICAL INTERPRETATION OF PHQ2 SCORE: NO FURTHER SCREENING NEEDED

## 2024-10-21 ENCOUNTER — E-ADVICE (OUTPATIENT)
Dept: ENDOCRINOLOGY | Age: 24
End: 2024-10-21

## 2024-10-21 DIAGNOSIS — R00.2 PALPITATIONS: ICD-10-CM

## 2024-10-21 DIAGNOSIS — O13.3 GESTATIONAL HYPERTENSION, THIRD TRIMESTER (CMD): ICD-10-CM

## 2024-10-21 DIAGNOSIS — R63.5 WEIGHT GAIN: Primary | ICD-10-CM

## 2024-10-21 RX ORDER — LABETALOL 100 MG/1
TABLET, FILM COATED ORAL
Qty: 180 TABLET | OUTPATIENT
Start: 2024-10-21

## 2024-10-22 ENCOUNTER — TELEPHONE (OUTPATIENT)
Dept: INTERNAL MEDICINE | Age: 24
End: 2024-10-22

## 2024-10-22 ENCOUNTER — OFFICE VISIT (OUTPATIENT)
Dept: ENDOCRINOLOGY | Age: 24
End: 2024-10-22

## 2024-10-22 ENCOUNTER — TELEPHONE (OUTPATIENT)
Dept: ENDOCRINOLOGY | Age: 24
End: 2024-10-22

## 2024-10-22 VITALS
SYSTOLIC BLOOD PRESSURE: 125 MMHG | HEART RATE: 80 BPM | WEIGHT: 229.06 LBS | DIASTOLIC BLOOD PRESSURE: 78 MMHG | BODY MASS INDEX: 42.15 KG/M2 | HEIGHT: 62 IN

## 2024-10-22 DIAGNOSIS — R63.5 WEIGHT GAIN: ICD-10-CM

## 2024-10-22 DIAGNOSIS — E66.01 OBESITY, CLASS III, BMI 40-49.9 (MORBID OBESITY)  (CMD): ICD-10-CM

## 2024-10-22 DIAGNOSIS — E28.2 PCOS (POLYCYSTIC OVARIAN SYNDROME): Primary | ICD-10-CM

## 2024-10-24 ENCOUNTER — E-ADVICE (OUTPATIENT)
Dept: ENDOCRINOLOGY | Age: 24
End: 2024-10-24

## 2024-12-09 ENCOUNTER — E-ADVICE (OUTPATIENT)
Dept: ENDOCRINOLOGY | Age: 24
End: 2024-12-09

## 2024-12-09 RX ORDER — SEMAGLUTIDE 0.5 MG/.5ML
0.5 INJECTION, SOLUTION SUBCUTANEOUS
Qty: 2 ML | Refills: 0 | Status: SHIPPED | OUTPATIENT
Start: 2024-12-09

## 2025-01-16 RX ORDER — SEMAGLUTIDE 1 MG/.5ML
1 INJECTION, SOLUTION SUBCUTANEOUS
Qty: 2 ML | Refills: 0 | Status: SHIPPED | OUTPATIENT
Start: 2025-01-16 | End: 2025-01-16 | Stop reason: SDUPTHER

## 2025-01-16 RX ORDER — SEMAGLUTIDE 1 MG/.5ML
INJECTION, SOLUTION SUBCUTANEOUS
Qty: 2 ML | OUTPATIENT
Start: 2025-01-16

## 2025-01-16 RX ORDER — SEMAGLUTIDE 1 MG/.5ML
1 INJECTION, SOLUTION SUBCUTANEOUS
Qty: 2 ML | Refills: 0 | Status: SHIPPED | OUTPATIENT
Start: 2025-01-16

## 2025-02-20 ENCOUNTER — E-ADVICE (OUTPATIENT)
Dept: ENDOCRINOLOGY | Age: 25
End: 2025-02-20

## 2025-02-20 RX ORDER — TIRZEPATIDE 2.5 MG/.5ML
2.5 INJECTION, SOLUTION SUBCUTANEOUS
Qty: 2 ML | Refills: 0 | Status: SHIPPED | OUTPATIENT
Start: 2025-02-20

## 2025-02-20 RX ORDER — TIRZEPATIDE 7.5 MG/.5ML
7.5 INJECTION, SOLUTION SUBCUTANEOUS
Qty: 2 ML | Refills: 0 | Status: SHIPPED | OUTPATIENT
Start: 2025-02-20 | End: 2025-02-20 | Stop reason: DRUGHIGH

## 2025-03-27 ENCOUNTER — APPOINTMENT (OUTPATIENT)
Dept: ENDOCRINOLOGY | Age: 25
End: 2025-03-27

## 2025-05-25 ENCOUNTER — OFFICE VISIT (OUTPATIENT)
Dept: URGENT CARE | Age: 25
End: 2025-05-25

## 2025-05-25 VITALS
DIASTOLIC BLOOD PRESSURE: 72 MMHG | HEIGHT: 62 IN | TEMPERATURE: 97.9 F | OXYGEN SATURATION: 20 % | BODY MASS INDEX: 42.14 KG/M2 | RESPIRATION RATE: 20 BRPM | SYSTOLIC BLOOD PRESSURE: 115 MMHG | WEIGHT: 229 LBS | HEART RATE: 82 BPM

## 2025-05-25 DIAGNOSIS — M54.89 OTHER ACUTE BACK PAIN: Primary | ICD-10-CM

## 2025-05-25 LAB
APPEARANCE, POC: CLEAR
B-HCG UR QL: NEGATIVE
BILIRUB UR QL STRIP: NEGATIVE
COLOR UR: YELLOW
GLUCOSE UR-MCNC: NEGATIVE MG/DL
HGB UR QL STRIP: NEGATIVE
INTERNAL PROCEDURAL CONTROLS ACCEPTABLE: YES
KETONES UR STRIP-MCNC: NEGATIVE MG/DL
LEUKOCYTE ESTERASE UR QL STRIP: NEGATIVE
NITRITE UR QL STRIP: NEGATIVE
PH UR: 6 [PH] (ref 5–7)
PROT UR-MCNC: ABNORMAL MG/DL
SP GR UR: >= 1.03 (ref 1–1.03)
TEST LOT EXPIRATION DATE: ABNORMAL
TEST LOT EXPIRATION DATE: NORMAL
TEST LOT NUMBER: ABNORMAL
TEST LOT NUMBER: NORMAL
UROBILINOGEN UR-MCNC: 0.2 MG/DL (ref 0–1)

## 2025-05-25 PROCEDURE — 81002 URINALYSIS NONAUTO W/O SCOPE: CPT | Performed by: FAMILY MEDICINE

## 2025-05-25 PROCEDURE — 81025 URINE PREGNANCY TEST: CPT | Performed by: FAMILY MEDICINE

## 2025-05-25 PROCEDURE — 99214 OFFICE O/P EST MOD 30 MIN: CPT | Performed by: FAMILY MEDICINE

## 2025-05-25 RX ORDER — CYCLOBENZAPRINE HCL 10 MG
10 TABLET ORAL 3 TIMES DAILY PRN
Qty: 15 TABLET | Refills: 0 | Status: SHIPPED | OUTPATIENT
Start: 2025-05-25

## 2025-05-25 RX ORDER — IBUPROFEN 600 MG/1
600 TABLET, FILM COATED ORAL EVERY 6 HOURS PRN
Qty: 30 TABLET | Refills: 0 | Status: SHIPPED | OUTPATIENT
Start: 2025-05-25

## 2025-05-25 RX ORDER — KETOROLAC TROMETHAMINE 30 MG/ML
60 INJECTION, SOLUTION INTRAMUSCULAR; INTRAVENOUS ONCE
Status: SHIPPED | OUTPATIENT
Start: 2025-05-25 | End: 2025-05-28

## 2025-06-10 ENCOUNTER — TELEPHONE (OUTPATIENT)
Dept: INTERNAL MEDICINE | Age: 25
End: 2025-06-10

## 2025-07-10 ENCOUNTER — TELEPHONE (OUTPATIENT)
Dept: INTERNAL MEDICINE | Age: 25
End: 2025-07-10

## 2025-07-10 DIAGNOSIS — Z01.818 PREOP EXAMINATION: Primary | ICD-10-CM

## 2025-07-14 ENCOUNTER — EXTERNAL LAB (OUTPATIENT)
Dept: HEALTH INFORMATION MANAGEMENT | Facility: OTHER | Age: 25
End: 2025-07-14

## 2025-07-14 LAB
LAB RESULT: NORMAL
LAB RESULT: NORMAL

## 2025-07-15 ENCOUNTER — E-ADVICE (OUTPATIENT)
Dept: INTERNAL MEDICINE | Age: 25
End: 2025-07-15

## 2025-07-16 ENCOUNTER — LAB SERVICES (OUTPATIENT)
Dept: LAB | Age: 25
End: 2025-07-16

## 2025-07-16 ENCOUNTER — APPOINTMENT (OUTPATIENT)
Dept: INTERNAL MEDICINE | Age: 25
End: 2025-07-16

## 2025-07-16 VITALS
TEMPERATURE: 97.5 F | BODY MASS INDEX: 45.43 KG/M2 | HEART RATE: 90 BPM | OXYGEN SATURATION: 98 % | HEIGHT: 62 IN | SYSTOLIC BLOOD PRESSURE: 119 MMHG | DIASTOLIC BLOOD PRESSURE: 79 MMHG | WEIGHT: 246.9 LBS

## 2025-07-16 DIAGNOSIS — M25.852 FEMOROACETABULAR IMPINGEMENT OF LEFT HIP: ICD-10-CM

## 2025-07-16 DIAGNOSIS — Z01.818 PREOP EXAMINATION: ICD-10-CM

## 2025-07-16 DIAGNOSIS — S76.012A MUSCLE STRAIN OF GLUTEAL REGION, LEFT, INITIAL ENCOUNTER: ICD-10-CM

## 2025-07-16 DIAGNOSIS — Z01.818 PREOP EXAMINATION: Primary | ICD-10-CM

## 2025-07-16 PROCEDURE — 80053 COMPREHEN METABOLIC PANEL: CPT | Performed by: CLINICAL MEDICAL LABORATORY

## 2025-07-16 PROCEDURE — 85025 COMPLETE CBC W/AUTO DIFF WBC: CPT | Performed by: CLINICAL MEDICAL LABORATORY

## 2025-07-16 PROCEDURE — 93000 ELECTROCARDIOGRAM COMPLETE: CPT | Performed by: FAMILY MEDICINE

## 2025-07-16 PROCEDURE — 99214 OFFICE O/P EST MOD 30 MIN: CPT | Performed by: FAMILY MEDICINE

## 2025-07-16 PROCEDURE — 83036 HEMOGLOBIN GLYCOSYLATED A1C: CPT | Performed by: CLINICAL MEDICAL LABORATORY

## 2025-07-16 ASSESSMENT — PATIENT HEALTH QUESTIONNAIRE - PHQ9
SUM OF ALL RESPONSES TO PHQ9 QUESTIONS 1 AND 2: 0
SUM OF ALL RESPONSES TO PHQ9 QUESTIONS 1 AND 2: 0
CLINICAL INTERPRETATION OF PHQ2 SCORE: NO FURTHER SCREENING NEEDED
2. FEELING DOWN, DEPRESSED OR HOPELESS: NOT AT ALL
1. LITTLE INTEREST OR PLEASURE IN DOING THINGS: NOT AT ALL

## 2025-07-16 ASSESSMENT — ENCOUNTER SYMPTOMS
CONSTITUTIONAL NEGATIVE: 1
RESPIRATORY NEGATIVE: 1

## 2025-07-16 ASSESSMENT — PAIN SCALES - GENERAL: PAINLEVEL_OUTOF10: 0

## 2025-07-17 ENCOUNTER — TELEPHONE (OUTPATIENT)
Dept: INTERNAL MEDICINE | Age: 25
End: 2025-07-17

## 2025-07-17 LAB
ALBUMIN SERPL-MCNC: 3.5 G/DL (ref 3.4–5)
ALBUMIN/GLOB SERPL: 1 {RATIO} (ref 1–2.4)
ALP SERPL-CCNC: 98 UNITS/L (ref 45–117)
ALT SERPL-CCNC: 24 UNITS/L
ANION GAP SERPL CALC-SCNC: 11 MMOL/L (ref 7–19)
AST SERPL-CCNC: 16 UNITS/L
BASOPHILS # BLD: 0.1 K/MCL (ref 0–0.3)
BASOPHILS NFR BLD: 1 %
BILIRUB SERPL-MCNC: 0.3 MG/DL (ref 0.2–1)
BUN SERPL-MCNC: 9 MG/DL (ref 6–20)
BUN/CREAT SERPL: 13 (ref 7–25)
CALCIUM SERPL-MCNC: 9.4 MG/DL (ref 8.4–10.2)
CHLORIDE SERPL-SCNC: 105 MMOL/L (ref 97–110)
CO2 SERPL-SCNC: 27 MMOL/L (ref 21–32)
CREAT SERPL-MCNC: 0.72 MG/DL (ref 0.51–0.95)
DEPRECATED RDW RBC: 47.5 FL (ref 39–50)
EGFRCR SERPLBLD CKD-EPI 2021: >90 ML/MIN/{1.73_M2}
EOSINOPHIL # BLD: 0.2 K/MCL (ref 0–0.5)
EOSINOPHIL NFR BLD: 2 %
ERYTHROCYTE [DISTWIDTH] IN BLOOD: 16.8 % (ref 11–15)
FASTING DURATION TIME PATIENT: NORMAL H
GLOBULIN SER-MCNC: 3.6 G/DL (ref 2–4)
GLUCOSE SERPL-MCNC: 88 MG/DL (ref 70–99)
HBA1C MFR BLD: 5.7 % (ref 4.5–5.6)
HCT VFR BLD CALC: 42.8 % (ref 36–46.5)
HGB BLD-MCNC: 13.4 G/DL (ref 12–15.5)
IMM GRANULOCYTES # BLD AUTO: 0 K/MCL (ref 0–0.2)
IMM GRANULOCYTES # BLD: 0 %
LYMPHOCYTES # BLD: 3.1 K/MCL (ref 1–4.8)
LYMPHOCYTES NFR BLD: 30 %
MCH RBC QN AUTO: 25 PG (ref 26–34)
MCHC RBC AUTO-ENTMCNC: 31.3 G/DL (ref 32–36.5)
MCV RBC AUTO: 79.7 FL (ref 78–100)
MONOCYTES # BLD: 0.6 K/MCL (ref 0.3–0.9)
MONOCYTES NFR BLD: 6 %
NEUTROPHILS # BLD: 6.5 K/MCL (ref 1.8–7.7)
NEUTROPHILS NFR BLD: 61 %
NRBC BLD MANUAL-RTO: 0 /100 WBC
PLATELET # BLD AUTO: 407 K/MCL (ref 140–450)
POTASSIUM SERPL-SCNC: 3.9 MMOL/L (ref 3.4–5.1)
PROT SERPL-MCNC: 7.1 G/DL (ref 6.4–8.2)
RBC # BLD: 5.37 MIL/MCL (ref 4–5.2)
SODIUM SERPL-SCNC: 139 MMOL/L (ref 135–145)
WBC # BLD: 10.6 K/MCL (ref 4.2–11)

## 2025-07-21 ENCOUNTER — APPOINTMENT (OUTPATIENT)
Dept: INTERNAL MEDICINE | Age: 25
End: 2025-07-21

## 2025-07-21 VITALS
DIASTOLIC BLOOD PRESSURE: 78 MMHG | BODY MASS INDEX: 45.85 KG/M2 | OXYGEN SATURATION: 98 % | HEART RATE: 60 BPM | WEIGHT: 250.7 LBS | SYSTOLIC BLOOD PRESSURE: 115 MMHG | TEMPERATURE: 97.5 F

## 2025-07-21 DIAGNOSIS — R73.03 PREDIABETES: ICD-10-CM

## 2025-07-21 DIAGNOSIS — R71.8 MICROCYTOSIS: ICD-10-CM

## 2025-07-21 DIAGNOSIS — Z00.00 ANNUAL PHYSICAL EXAM: Primary | ICD-10-CM

## 2025-07-21 LAB — FERRITIN SERPL-MCNC: 17 NG/ML (ref 8–252)

## 2025-07-21 PROCEDURE — 99214 OFFICE O/P EST MOD 30 MIN: CPT

## 2025-07-21 ASSESSMENT — PAIN SCALES - GENERAL: PAINLEVEL_OUTOF10: 0

## 2025-08-18 ENCOUNTER — OFFICE VISIT (OUTPATIENT)
Dept: INTERNAL MEDICINE | Age: 25
End: 2025-08-18

## 2025-08-18 VITALS
DIASTOLIC BLOOD PRESSURE: 68 MMHG | HEIGHT: 62 IN | OXYGEN SATURATION: 98 % | BODY MASS INDEX: 46.28 KG/M2 | HEART RATE: 96 BPM | WEIGHT: 251.5 LBS | TEMPERATURE: 99.4 F | SYSTOLIC BLOOD PRESSURE: 108 MMHG

## 2025-08-18 DIAGNOSIS — E66.09 CLASS 2 OBESITY DUE TO EXCESS CALORIES WITHOUT SERIOUS COMORBIDITY WITH BODY MASS INDEX (BMI) OF 38.0 TO 38.9 IN ADULT: Primary | ICD-10-CM

## 2025-08-18 DIAGNOSIS — R10.13 EPIGASTRIC PAIN: ICD-10-CM

## 2025-08-18 DIAGNOSIS — E66.812 CLASS 2 OBESITY DUE TO EXCESS CALORIES WITHOUT SERIOUS COMORBIDITY WITH BODY MASS INDEX (BMI) OF 38.0 TO 38.9 IN ADULT: Primary | ICD-10-CM

## 2025-08-18 DIAGNOSIS — Z87.19 HX OF GASTROESOPHAGEAL REFLUX (GERD): ICD-10-CM

## 2025-08-18 PROCEDURE — 99214 OFFICE O/P EST MOD 30 MIN: CPT

## 2025-08-18 RX ORDER — OMEPRAZOLE 40 MG/1
40 CAPSULE, DELAYED RELEASE ORAL DAILY
Qty: 90 CAPSULE | Refills: 3 | Status: SHIPPED | OUTPATIENT
Start: 2025-08-18

## 2025-08-18 ASSESSMENT — PAIN SCALES - GENERAL: PAINLEVEL_OUTOF10: 4

## 2025-08-19 PROBLEM — R10.13 EPIGASTRIC PAIN: Status: ACTIVE | Noted: 2025-08-19

## 2025-08-23 ENCOUNTER — HOSPITAL ENCOUNTER (OUTPATIENT)
Age: 25
End: 2025-08-23

## 2025-08-23 DIAGNOSIS — R10.13 EPIGASTRIC PAIN: ICD-10-CM

## 2025-08-23 PROCEDURE — 74018 RADEX ABDOMEN 1 VIEW: CPT

## 2025-08-26 ENCOUNTER — RESULTS FOLLOW-UP (OUTPATIENT)
Dept: INTERNAL MEDICINE | Age: 25
End: 2025-08-26

## 2025-09-18 ENCOUNTER — APPOINTMENT (OUTPATIENT)
Dept: INTERNAL MEDICINE | Age: 25
End: 2025-09-18